# Patient Record
Sex: MALE | Race: WHITE | NOT HISPANIC OR LATINO
[De-identification: names, ages, dates, MRNs, and addresses within clinical notes are randomized per-mention and may not be internally consistent; named-entity substitution may affect disease eponyms.]

---

## 2019-01-09 ENCOUNTER — APPOINTMENT (OUTPATIENT)
Dept: ORTHOPEDIC SURGERY | Facility: CLINIC | Age: 79
End: 2019-01-09

## 2020-01-17 ENCOUNTER — APPOINTMENT (OUTPATIENT)
Dept: NEUROLOGY | Facility: CLINIC | Age: 80
End: 2020-01-17

## 2020-09-09 ENCOUNTER — TRANSCRIPTION ENCOUNTER (OUTPATIENT)
Age: 80
End: 2020-09-09

## 2021-08-09 ENCOUNTER — EMERGENCY (EMERGENCY)
Facility: HOSPITAL | Age: 81
LOS: 1 days | Discharge: HOME | End: 2021-08-09
Attending: EMERGENCY MEDICINE
Payer: MEDICARE

## 2021-08-09 VITALS
SYSTOLIC BLOOD PRESSURE: 175 MMHG | WEIGHT: 184.97 LBS | DIASTOLIC BLOOD PRESSURE: 95 MMHG | TEMPERATURE: 97 F | HEIGHT: 70 IN | HEART RATE: 82 BPM | OXYGEN SATURATION: 96 % | RESPIRATION RATE: 20 BRPM

## 2021-08-09 LAB
BASOPHILS # BLD AUTO: 0.03 K/UL — SIGNIFICANT CHANGE UP (ref 0–0.2)
BASOPHILS NFR BLD AUTO: 0.3 % — SIGNIFICANT CHANGE UP (ref 0–2)
EOSINOPHIL # BLD AUTO: 0.16 K/UL — SIGNIFICANT CHANGE UP (ref 0–0.5)
EOSINOPHIL NFR BLD AUTO: 1.7 % — SIGNIFICANT CHANGE UP (ref 0–6)
HCT VFR BLD CALC: 46.6 % — SIGNIFICANT CHANGE UP (ref 39–50)
HGB BLD-MCNC: 15.6 G/DL — SIGNIFICANT CHANGE UP (ref 13–17)
IMM GRANULOCYTES NFR BLD AUTO: 0.4 % — SIGNIFICANT CHANGE UP (ref 0–1.5)
LYMPHOCYTES # BLD AUTO: 1.13 K/UL — SIGNIFICANT CHANGE UP (ref 1–3.3)
LYMPHOCYTES # BLD AUTO: 12.1 % — LOW (ref 13–44)
MCHC RBC-ENTMCNC: 29.9 PG — SIGNIFICANT CHANGE UP (ref 27–34)
MCHC RBC-ENTMCNC: 33.5 GM/DL — SIGNIFICANT CHANGE UP (ref 32–36)
MCV RBC AUTO: 89.3 FL — SIGNIFICANT CHANGE UP (ref 80–100)
MONOCYTES # BLD AUTO: 0.59 K/UL — SIGNIFICANT CHANGE UP (ref 0–0.9)
MONOCYTES NFR BLD AUTO: 6.3 % — SIGNIFICANT CHANGE UP (ref 2–14)
NEUTROPHILS # BLD AUTO: 7.39 K/UL — SIGNIFICANT CHANGE UP (ref 1.8–7.4)
NEUTROPHILS NFR BLD AUTO: 79.2 % — HIGH (ref 43–77)
PLATELET # BLD AUTO: 296 K/UL — SIGNIFICANT CHANGE UP (ref 150–400)
RBC # BLD: 5.22 M/UL — SIGNIFICANT CHANGE UP (ref 4.2–5.8)
RBC # FLD: 13.4 % — SIGNIFICANT CHANGE UP (ref 10.3–14.5)
WBC # BLD: 9.34 K/UL — SIGNIFICANT CHANGE UP (ref 3.8–10.5)
WBC # FLD AUTO: 9.34 K/UL — SIGNIFICANT CHANGE UP (ref 3.8–10.5)

## 2021-08-09 PROCEDURE — 99285 EMERGENCY DEPT VISIT HI MDM: CPT

## 2021-08-09 RX ORDER — KETOROLAC TROMETHAMINE 30 MG/ML
15 SYRINGE (ML) INJECTION ONCE
Refills: 0 | Status: DISCONTINUED | OUTPATIENT
Start: 2021-08-09 | End: 2021-08-09

## 2021-08-09 RX ORDER — ONDANSETRON 8 MG/1
4 TABLET, FILM COATED ORAL ONCE
Refills: 0 | Status: COMPLETED | OUTPATIENT
Start: 2021-08-09 | End: 2021-08-09

## 2021-08-09 RX ORDER — SODIUM CHLORIDE 9 MG/ML
1000 INJECTION INTRAMUSCULAR; INTRAVENOUS; SUBCUTANEOUS ONCE
Refills: 0 | Status: COMPLETED | OUTPATIENT
Start: 2021-08-09 | End: 2021-08-09

## 2021-08-09 RX ADMIN — ONDANSETRON 4 MILLIGRAM(S): 8 TABLET, FILM COATED ORAL at 23:17

## 2021-08-09 RX ADMIN — Medication 15 MILLIGRAM(S): at 23:16

## 2021-08-09 NOTE — ED ADULT NURSE REASSESSMENT NOTE - NS ED NURSE REASSESS COMMENT FT1
Report received from Mony MOREIRA. Pt received A&Ox4 c/o nauseousness @ this time. MD Heard made aware. Respirations even & unlabored. NAD present. Pt given call bell for further assistance. Pt awaiting CT @ this time. Pt made aware of plan of care and verbalized understanding.

## 2021-08-09 NOTE — ED ADULT TRIAGE NOTE - CHIEF COMPLAINT QUOTE
Pt BIBA A&Ox3 c/o left flank pain with associated LLQ abdominal pain and nausea. Pt reports hx of kidney stones and states pain is similar to when he had them. Denies any urinary symptoms or fever. hypertensive in triage.

## 2021-08-10 VITALS
SYSTOLIC BLOOD PRESSURE: 169 MMHG | DIASTOLIC BLOOD PRESSURE: 86 MMHG | HEART RATE: 83 BPM | TEMPERATURE: 98 F | RESPIRATION RATE: 18 BRPM | OXYGEN SATURATION: 97 %

## 2021-08-10 LAB
ANION GAP SERPL CALC-SCNC: 11 MMOL/L — SIGNIFICANT CHANGE UP (ref 5–17)
APPEARANCE UR: CLEAR — SIGNIFICANT CHANGE UP
BACTERIA # UR AUTO: ABNORMAL
BILIRUB UR-MCNC: NEGATIVE — SIGNIFICANT CHANGE UP
BUN SERPL-MCNC: 19 MG/DL — SIGNIFICANT CHANGE UP (ref 8–20)
CALCIUM SERPL-MCNC: 8.7 MG/DL — SIGNIFICANT CHANGE UP (ref 8.6–10.2)
CHLORIDE SERPL-SCNC: 103 MMOL/L — SIGNIFICANT CHANGE UP (ref 98–107)
CO2 SERPL-SCNC: 22 MMOL/L — SIGNIFICANT CHANGE UP (ref 22–29)
COLOR SPEC: YELLOW — SIGNIFICANT CHANGE UP
CREAT SERPL-MCNC: 1.14 MG/DL — SIGNIFICANT CHANGE UP (ref 0.5–1.3)
DIFF PNL FLD: ABNORMAL
EPI CELLS # UR: SIGNIFICANT CHANGE UP
GLUCOSE SERPL-MCNC: 112 MG/DL — HIGH (ref 70–99)
GLUCOSE UR QL: NEGATIVE MG/DL — SIGNIFICANT CHANGE UP
KETONES UR-MCNC: ABNORMAL
LEUKOCYTE ESTERASE UR-ACNC: ABNORMAL
NITRITE UR-MCNC: NEGATIVE — SIGNIFICANT CHANGE UP
PH UR: 7 — SIGNIFICANT CHANGE UP (ref 5–8)
POTASSIUM SERPL-MCNC: 4.3 MMOL/L — SIGNIFICANT CHANGE UP (ref 3.5–5.3)
POTASSIUM SERPL-SCNC: 4.3 MMOL/L — SIGNIFICANT CHANGE UP (ref 3.5–5.3)
PROT UR-MCNC: 30 MG/DL
RBC CASTS # UR COMP ASSIST: SIGNIFICANT CHANGE UP /HPF (ref 0–4)
SODIUM SERPL-SCNC: 136 MMOL/L — SIGNIFICANT CHANGE UP (ref 135–145)
SP GR SPEC: 1 — LOW (ref 1.01–1.02)
UROBILINOGEN FLD QL: NEGATIVE MG/DL — SIGNIFICANT CHANGE UP
WBC UR QL: SIGNIFICANT CHANGE UP

## 2021-08-10 PROCEDURE — 74176 CT ABD & PELVIS W/O CONTRAST: CPT | Mod: MA

## 2021-08-10 PROCEDURE — 36415 COLL VENOUS BLD VENIPUNCTURE: CPT

## 2021-08-10 PROCEDURE — 96374 THER/PROPH/DIAG INJ IV PUSH: CPT

## 2021-08-10 PROCEDURE — 80048 BASIC METABOLIC PNL TOTAL CA: CPT

## 2021-08-10 PROCEDURE — 96375 TX/PRO/DX INJ NEW DRUG ADDON: CPT

## 2021-08-10 PROCEDURE — 81001 URINALYSIS AUTO W/SCOPE: CPT

## 2021-08-10 PROCEDURE — 99284 EMERGENCY DEPT VISIT MOD MDM: CPT | Mod: 25

## 2021-08-10 PROCEDURE — 96376 TX/PRO/DX INJ SAME DRUG ADON: CPT

## 2021-08-10 PROCEDURE — 74176 CT ABD & PELVIS W/O CONTRAST: CPT | Mod: 26,MA

## 2021-08-10 PROCEDURE — 85025 COMPLETE CBC W/AUTO DIFF WBC: CPT

## 2021-08-10 RX ORDER — ONDANSETRON 8 MG/1
1 TABLET, FILM COATED ORAL
Qty: 12 | Refills: 0
Start: 2021-08-10 | End: 2021-08-13

## 2021-08-10 RX ADMIN — ONDANSETRON 4 MILLIGRAM(S): 8 TABLET, FILM COATED ORAL at 00:00

## 2021-08-10 RX ADMIN — SODIUM CHLORIDE 1000 MILLILITER(S): 9 INJECTION INTRAMUSCULAR; INTRAVENOUS; SUBCUTANEOUS at 00:00

## 2021-08-10 NOTE — ED ADULT NURSE NOTE - OBJECTIVE STATEMENT
Pt BIBA A&Ox3 c/o left flank pain with associated LLQ abdominal pain and nausea, pt has pmhx of kidney stones, pain is similar to episodes in past. respirations even and unlabored. pt in NAD after receiving meds. POC discussed w/ patient, will continue to monitor. pt has no known allergies, denies cp, sob or ha.

## 2021-08-10 NOTE — ED PROVIDER NOTE - ATTENDING CONTRIBUTION TO CARE
I personally saw the patient with the resident, and completed the key components of the history and physical exam. I then discussed the management plan with the resident.   gen in nad resp clear cardiac n omurmur abd soft neurop intact

## 2021-08-10 NOTE — ED PROVIDER NOTE - CLINICAL SUMMARY MEDICAL DECISION MAKING FREE TEXT BOX
Patient presenting with known history of kidney stones. Ct imaging showing nonobstructive stones. Will d/c with instructions for pcp f/u and conservative management.

## 2021-08-10 NOTE — ED PROVIDER NOTE - OBJECTIVE STATEMENT
82 y/o male with PMHx of 4 prior kidney stones  p/w abdominal pain and flank pain starting 2 days worsening  sharp, feel similar to previous episodes denies urinary symptoms denies fevers chill chest pain states he had abdominal mesh surgeries many years ago, denies other abdominal surgeries

## 2021-08-10 NOTE — ED PROVIDER NOTE - PATIENT PORTAL LINK FT
You can access the FollowMyHealth Patient Portal offered by [facility] by registering at the following website: [email or web address]. By joining [facility]’s FollowMyHealth portal, you will also be able to view your health information using other applications (apps) compatible with our system.

## 2021-12-12 ENCOUNTER — TRANSCRIPTION ENCOUNTER (OUTPATIENT)
Age: 81
End: 2021-12-12

## 2023-01-19 ENCOUNTER — TRANSCRIPTION ENCOUNTER (OUTPATIENT)
Age: 83
End: 2023-01-19

## 2023-01-19 ENCOUNTER — NON-APPOINTMENT (OUTPATIENT)
Age: 83
End: 2023-01-19

## 2023-01-19 ENCOUNTER — INPATIENT (INPATIENT)
Facility: HOSPITAL | Age: 83
LOS: 0 days | Discharge: HOME | DRG: 247 | End: 2023-01-20
Attending: FAMILY MEDICINE | Admitting: STUDENT IN AN ORGANIZED HEALTH CARE EDUCATION/TRAINING PROGRAM
Payer: MEDICARE

## 2023-01-19 VITALS
DIASTOLIC BLOOD PRESSURE: 86 MMHG | SYSTOLIC BLOOD PRESSURE: 158 MMHG | TEMPERATURE: 98 F | RESPIRATION RATE: 20 BRPM | WEIGHT: 190.04 LBS | HEART RATE: 77 BPM | HEIGHT: 70 IN | OXYGEN SATURATION: 98 %

## 2023-01-19 DIAGNOSIS — R07.9 CHEST PAIN, UNSPECIFIED: ICD-10-CM

## 2023-01-19 DIAGNOSIS — I21.4 NON-ST ELEVATION (NSTEMI) MYOCARDIAL INFARCTION: ICD-10-CM

## 2023-01-19 DIAGNOSIS — Z96.651 PRESENCE OF RIGHT ARTIFICIAL KNEE JOINT: Chronic | ICD-10-CM

## 2023-01-19 DIAGNOSIS — Z96.652 PRESENCE OF LEFT ARTIFICIAL KNEE JOINT: Chronic | ICD-10-CM

## 2023-01-19 PROBLEM — N20.0 CALCULUS OF KIDNEY: Chronic | Status: ACTIVE | Noted: 2021-08-10

## 2023-01-19 LAB
ALBUMIN SERPL ELPH-MCNC: 3.9 G/DL — SIGNIFICANT CHANGE UP (ref 3.3–5.2)
ALP SERPL-CCNC: 106 U/L — SIGNIFICANT CHANGE UP (ref 40–120)
ALT FLD-CCNC: 13 U/L — SIGNIFICANT CHANGE UP
ANION GAP SERPL CALC-SCNC: 9 MMOL/L — SIGNIFICANT CHANGE UP (ref 5–17)
APTT BLD: 28.8 SEC — SIGNIFICANT CHANGE UP (ref 27.5–35.5)
AST SERPL-CCNC: 23 U/L — SIGNIFICANT CHANGE UP
BASOPHILS # BLD AUTO: 0.03 K/UL — SIGNIFICANT CHANGE UP (ref 0–0.2)
BASOPHILS NFR BLD AUTO: 0.3 % — SIGNIFICANT CHANGE UP (ref 0–2)
BILIRUB SERPL-MCNC: 0.3 MG/DL — LOW (ref 0.4–2)
BLD GP AB SCN SERPL QL: SIGNIFICANT CHANGE UP
BUN SERPL-MCNC: 22.9 MG/DL — HIGH (ref 8–20)
CALCIUM SERPL-MCNC: 8.7 MG/DL — SIGNIFICANT CHANGE UP (ref 8.4–10.5)
CHLORIDE SERPL-SCNC: 105 MMOL/L — SIGNIFICANT CHANGE UP (ref 96–108)
CO2 SERPL-SCNC: 26 MMOL/L — SIGNIFICANT CHANGE UP (ref 22–29)
CREAT SERPL-MCNC: 1.24 MG/DL — SIGNIFICANT CHANGE UP (ref 0.5–1.3)
EGFR: 59 ML/MIN/1.73M2 — LOW
EOSINOPHIL # BLD AUTO: 0.09 K/UL — SIGNIFICANT CHANGE UP (ref 0–0.5)
EOSINOPHIL NFR BLD AUTO: 1 % — SIGNIFICANT CHANGE UP (ref 0–6)
FLUAV AG NPH QL: SIGNIFICANT CHANGE UP
FLUBV AG NPH QL: SIGNIFICANT CHANGE UP
GLUCOSE SERPL-MCNC: 120 MG/DL — HIGH (ref 70–99)
HCT VFR BLD CALC: 46.6 % — SIGNIFICANT CHANGE UP (ref 39–50)
HGB BLD-MCNC: 15.4 G/DL — SIGNIFICANT CHANGE UP (ref 13–17)
IMM GRANULOCYTES NFR BLD AUTO: 0.4 % — SIGNIFICANT CHANGE UP (ref 0–0.9)
INR BLD: 1.01 RATIO — SIGNIFICANT CHANGE UP (ref 0.88–1.16)
LYMPHOCYTES # BLD AUTO: 0.83 K/UL — LOW (ref 1–3.3)
LYMPHOCYTES # BLD AUTO: 9 % — LOW (ref 13–44)
MCHC RBC-ENTMCNC: 29.7 PG — SIGNIFICANT CHANGE UP (ref 27–34)
MCHC RBC-ENTMCNC: 33 GM/DL — SIGNIFICANT CHANGE UP (ref 32–36)
MCV RBC AUTO: 89.8 FL — SIGNIFICANT CHANGE UP (ref 80–100)
MONOCYTES # BLD AUTO: 0.5 K/UL — SIGNIFICANT CHANGE UP (ref 0–0.9)
MONOCYTES NFR BLD AUTO: 5.4 % — SIGNIFICANT CHANGE UP (ref 2–14)
NEUTROPHILS # BLD AUTO: 7.72 K/UL — HIGH (ref 1.8–7.4)
NEUTROPHILS NFR BLD AUTO: 83.9 % — HIGH (ref 43–77)
NT-PROBNP SERPL-SCNC: 64 PG/ML — SIGNIFICANT CHANGE UP (ref 0–300)
PLATELET # BLD AUTO: 297 K/UL — SIGNIFICANT CHANGE UP (ref 150–400)
POTASSIUM SERPL-MCNC: 4.2 MMOL/L — SIGNIFICANT CHANGE UP (ref 3.5–5.3)
POTASSIUM SERPL-SCNC: 4.2 MMOL/L — SIGNIFICANT CHANGE UP (ref 3.5–5.3)
PROT SERPL-MCNC: 7 G/DL — SIGNIFICANT CHANGE UP (ref 6.6–8.7)
PROTHROM AB SERPL-ACNC: 11.7 SEC — SIGNIFICANT CHANGE UP (ref 10.5–13.4)
RBC # BLD: 5.19 M/UL — SIGNIFICANT CHANGE UP (ref 4.2–5.8)
RBC # FLD: 13.2 % — SIGNIFICANT CHANGE UP (ref 10.3–14.5)
RSV RNA NPH QL NAA+NON-PROBE: SIGNIFICANT CHANGE UP
SARS-COV-2 RNA SPEC QL NAA+PROBE: SIGNIFICANT CHANGE UP
SODIUM SERPL-SCNC: 140 MMOL/L — SIGNIFICANT CHANGE UP (ref 135–145)
TROPONIN T SERPL-MCNC: 0.02 NG/ML — SIGNIFICANT CHANGE UP (ref 0–0.06)
TROPONIN T SERPL-MCNC: 3.81 NG/ML — HIGH (ref 0–0.06)
WBC # BLD: 9.21 K/UL — SIGNIFICANT CHANGE UP (ref 3.8–10.5)
WBC # FLD AUTO: 9.21 K/UL — SIGNIFICANT CHANGE UP (ref 3.8–10.5)

## 2023-01-19 PROCEDURE — 93306 TTE W/DOPPLER COMPLETE: CPT | Mod: 26

## 2023-01-19 PROCEDURE — 92978 ENDOLUMINL IVUS OCT C 1ST: CPT | Mod: 26,LC

## 2023-01-19 PROCEDURE — 99152 MOD SED SAME PHYS/QHP 5/>YRS: CPT

## 2023-01-19 PROCEDURE — 99285 EMERGENCY DEPT VISIT HI MDM: CPT | Mod: CS,GC

## 2023-01-19 PROCEDURE — 92941 PRQ TRLML REVSC TOT OCCL AMI: CPT | Mod: LC

## 2023-01-19 PROCEDURE — 93010 ELECTROCARDIOGRAM REPORT: CPT | Mod: 76

## 2023-01-19 PROCEDURE — 71045 X-RAY EXAM CHEST 1 VIEW: CPT | Mod: 26

## 2023-01-19 PROCEDURE — 93458 L HRT ARTERY/VENTRICLE ANGIO: CPT | Mod: 26,XU

## 2023-01-19 PROCEDURE — 99223 1ST HOSP IP/OBS HIGH 75: CPT

## 2023-01-19 RX ORDER — HEPARIN SODIUM 5000 [USP'U]/ML
INJECTION INTRAVENOUS; SUBCUTANEOUS
Qty: 25000 | Refills: 0 | Status: DISCONTINUED | OUTPATIENT
Start: 2023-01-19 | End: 2023-01-20

## 2023-01-19 RX ORDER — HEPARIN SODIUM 5000 [USP'U]/ML
5300 INJECTION INTRAVENOUS; SUBCUTANEOUS EVERY 6 HOURS
Refills: 0 | Status: DISCONTINUED | OUTPATIENT
Start: 2023-01-19 | End: 2023-01-19

## 2023-01-19 RX ORDER — TICAGRELOR 90 MG/1
90 TABLET ORAL EVERY 12 HOURS
Refills: 0 | Status: DISCONTINUED | OUTPATIENT
Start: 2023-01-20 | End: 2023-01-20

## 2023-01-19 RX ORDER — SODIUM CHLORIDE 9 MG/ML
1000 INJECTION INTRAMUSCULAR; INTRAVENOUS; SUBCUTANEOUS
Refills: 0 | Status: COMPLETED | OUTPATIENT
Start: 2023-01-19 | End: 2023-01-19

## 2023-01-19 RX ORDER — ONDANSETRON 8 MG/1
4 TABLET, FILM COATED ORAL ONCE
Refills: 0 | Status: COMPLETED | OUTPATIENT
Start: 2023-01-19 | End: 2023-01-19

## 2023-01-19 RX ORDER — METOPROLOL TARTRATE 50 MG
12.5 TABLET ORAL
Refills: 0 | Status: DISCONTINUED | OUTPATIENT
Start: 2023-01-19 | End: 2023-01-20

## 2023-01-19 RX ORDER — PANTOPRAZOLE SODIUM 20 MG/1
40 TABLET, DELAYED RELEASE ORAL
Refills: 0 | Status: DISCONTINUED | OUTPATIENT
Start: 2023-01-19 | End: 2023-01-20

## 2023-01-19 RX ORDER — ASPIRIN/CALCIUM CARB/MAGNESIUM 324 MG
81 TABLET ORAL DAILY
Refills: 0 | Status: DISCONTINUED | OUTPATIENT
Start: 2023-01-19 | End: 2023-01-20

## 2023-01-19 RX ORDER — ASPIRIN/CALCIUM CARB/MAGNESIUM 324 MG
324 TABLET ORAL ONCE
Refills: 0 | Status: COMPLETED | OUTPATIENT
Start: 2023-01-19 | End: 2023-01-19

## 2023-01-19 RX ORDER — LANOLIN ALCOHOL/MO/W.PET/CERES
3 CREAM (GRAM) TOPICAL AT BEDTIME
Refills: 0 | Status: DISCONTINUED | OUTPATIENT
Start: 2023-01-19 | End: 2023-01-20

## 2023-01-19 RX ORDER — ATORVASTATIN CALCIUM 80 MG/1
40 TABLET, FILM COATED ORAL AT BEDTIME
Refills: 0 | Status: DISCONTINUED | OUTPATIENT
Start: 2023-01-19 | End: 2023-01-20

## 2023-01-19 RX ORDER — ZOLPIDEM TARTRATE 10 MG/1
5 TABLET ORAL AT BEDTIME
Refills: 0 | Status: DISCONTINUED | OUTPATIENT
Start: 2023-01-19 | End: 2023-01-20

## 2023-01-19 RX ORDER — SODIUM CHLORIDE 9 MG/ML
250 INJECTION INTRAMUSCULAR; INTRAVENOUS; SUBCUTANEOUS ONCE
Refills: 0 | Status: DISCONTINUED | OUTPATIENT
Start: 2023-01-19 | End: 2023-01-19

## 2023-01-19 RX ORDER — HEPARIN SODIUM 5000 [USP'U]/ML
5300 INJECTION INTRAVENOUS; SUBCUTANEOUS EVERY 6 HOURS
Refills: 0 | Status: DISCONTINUED | OUTPATIENT
Start: 2023-01-19 | End: 2023-01-20

## 2023-01-19 RX ORDER — FINASTERIDE 5 MG/1
5 TABLET, FILM COATED ORAL DAILY
Refills: 0 | Status: DISCONTINUED | OUTPATIENT
Start: 2023-01-19 | End: 2023-01-20

## 2023-01-19 RX ORDER — TAMSULOSIN HYDROCHLORIDE 0.4 MG/1
0.8 CAPSULE ORAL AT BEDTIME
Refills: 0 | Status: DISCONTINUED | OUTPATIENT
Start: 2023-01-19 | End: 2023-01-20

## 2023-01-19 RX ORDER — ONDANSETRON 8 MG/1
4 TABLET, FILM COATED ORAL EVERY 8 HOURS
Refills: 0 | Status: DISCONTINUED | OUTPATIENT
Start: 2023-01-19 | End: 2023-01-20

## 2023-01-19 RX ORDER — HEPARIN SODIUM 5000 [USP'U]/ML
5000 INJECTION INTRAVENOUS; SUBCUTANEOUS ONCE
Refills: 0 | Status: COMPLETED | OUTPATIENT
Start: 2023-01-19 | End: 2023-01-19

## 2023-01-19 RX ORDER — HEPARIN SODIUM 5000 [USP'U]/ML
INJECTION INTRAVENOUS; SUBCUTANEOUS
Qty: 25000 | Refills: 0 | Status: DISCONTINUED | OUTPATIENT
Start: 2023-01-19 | End: 2023-01-19

## 2023-01-19 RX ORDER — CLOPIDOGREL BISULFATE 75 MG/1
600 TABLET, FILM COATED ORAL ONCE
Refills: 0 | Status: COMPLETED | OUTPATIENT
Start: 2023-01-19 | End: 2023-01-19

## 2023-01-19 RX ORDER — LOSARTAN POTASSIUM 100 MG/1
25 TABLET, FILM COATED ORAL DAILY
Refills: 0 | Status: DISCONTINUED | OUTPATIENT
Start: 2023-01-20 | End: 2023-01-20

## 2023-01-19 RX ORDER — HEPARIN SODIUM 5000 [USP'U]/ML
5000 INJECTION INTRAVENOUS; SUBCUTANEOUS EVERY 8 HOURS
Refills: 0 | Status: DISCONTINUED | OUTPATIENT
Start: 2023-01-19 | End: 2023-01-19

## 2023-01-19 RX ORDER — ACETAMINOPHEN 500 MG
650 TABLET ORAL EVERY 6 HOURS
Refills: 0 | Status: DISCONTINUED | OUTPATIENT
Start: 2023-01-19 | End: 2023-01-20

## 2023-01-19 RX ADMIN — Medication 12.5 MILLIGRAM(S): at 20:28

## 2023-01-19 RX ADMIN — FINASTERIDE 5 MILLIGRAM(S): 5 TABLET, FILM COATED ORAL at 22:38

## 2023-01-19 RX ADMIN — HEPARIN SODIUM 5000 UNIT(S): 5000 INJECTION INTRAVENOUS; SUBCUTANEOUS at 14:14

## 2023-01-19 RX ADMIN — CLOPIDOGREL BISULFATE 600 MILLIGRAM(S): 75 TABLET, FILM COATED ORAL at 14:31

## 2023-01-19 RX ADMIN — Medication 324 MILLIGRAM(S): at 09:04

## 2023-01-19 RX ADMIN — ONDANSETRON 4 MILLIGRAM(S): 8 TABLET, FILM COATED ORAL at 19:38

## 2023-01-19 RX ADMIN — ZOLPIDEM TARTRATE 5 MILLIGRAM(S): 10 TABLET ORAL at 22:38

## 2023-01-19 RX ADMIN — TAMSULOSIN HYDROCHLORIDE 0.8 MILLIGRAM(S): 0.4 CAPSULE ORAL at 21:13

## 2023-01-19 RX ADMIN — Medication 75 MILLIGRAM(S): at 18:56

## 2023-01-19 RX ADMIN — ATORVASTATIN CALCIUM 40 MILLIGRAM(S): 80 TABLET, FILM COATED ORAL at 21:13

## 2023-01-19 RX ADMIN — SODIUM CHLORIDE 250 MILLILITER(S): 9 INJECTION INTRAMUSCULAR; INTRAVENOUS; SUBCUTANEOUS at 23:19

## 2023-01-19 RX ADMIN — HEPARIN SODIUM 1000 UNIT(S)/HR: 5000 INJECTION INTRAVENOUS; SUBCUTANEOUS at 14:14

## 2023-01-19 NOTE — H&P ADULT - HISTORY OF PRESENT ILLNESS
81 yo male with hx of Neuropathy, GERD, BPH and renal stones presented with complaints of chest pain. Patient reports that he was walking around this morning and started to have chest pain located near left shoulder and moved to right shoulder. Pain initially worsened but has now improved slightly. States that pain has been waxing and waning. No prior episodes of chest pain. Reports only previous cardiac work up was about 20 years ago without any significant findings. No other complaints.

## 2023-01-19 NOTE — ED PROVIDER NOTE - PROGRESS NOTE DETAILS
JK - patient still with intermittent CP at this time; labs WNL. Cardiology consulted, recommended TTE as well as NST; cath pending results at this time. Patient ready and agreeable to admission to telemetry for tele monitoring, cardiac recs, further imaging.

## 2023-01-19 NOTE — DISCHARGE NOTE PROVIDER - ATTENDING DISCHARGE PHYSICAL EXAMINATION:
T(C): 36.4 (01-20-23 @ 06:05), Max: 36.8 (01-19-23 @ 11:38)  HR: 61 (01-20-23 @ 06:05) (61 - 89)  BP: 127/67 (01-20-23 @ 06:05) (127/67 - 168/95)  RR: 17 (01-20-23 @ 06:05) (17 - 20)  SpO2: 98% (01-19-23 @ 22:00) (95% - 98%)RA    GEN - NAD  HEENT - NCAT, EOMI, MELLO,   RESP - CTA BL, no wheeze/rhonchi/crackles. not on supplemental O2.  CARDIO - NS1S2, RRR. No murmurs  ABD - Soft/Non tender/Non distended. Normal BS x4 quadrants.   Ext - No LYUBOV. RT GROIN SIDE: MILD BRUISE,non tender,positive pulse  MSK - full ROM of BL upper and lower extremities without pain or restriction. BL 5/5 strength on upper and lower extremities.   Neuro - cn 2-12 grossly intact.  no visible seizure activity appreciated. no tremor. gait not observed.   Psych- AAOx3. attentive. normal affect.

## 2023-01-19 NOTE — H&P ADULT - ASSESSMENT
83 yo male with hx of Neuropathy, GERD, BPH and renal stones presented with complaints of chest pain. Patient reports that he was walking around this morning and started to have chest pain located near left shoulder and moved to right shoulder. Pain initially worsened but has now improved slightly. States that pain has been waxing and waning. No prior episodes of chest pain. Reports only previous cardiac work up was about 20 years ago without any significant findings. No other complaints.      #Chest pain to r/o ACS  Tele monitoring  Trend trops/EKG  TTE  NST in AM  Cardiology recs appreciated  ASA daily     #Neuropathy  Pregablin 75 BID    #BPH  Finasteride   Flomax for alfuzosin    #GERD  Protonix    #Insomnia  Zolpidem ordered     DVT prophylaxis: Heparin

## 2023-01-19 NOTE — DISCHARGE NOTE PROVIDER - NSDCMRMEDTOKEN_GEN_ALL_CORE_FT
alfuzosin 10 mg oral tablet, extended release: 1 tab(s) orally once a day  finasteride 5 mg oral tablet: 1 tab(s) orally once a day  omeprazole 20 mg oral delayed release capsule: 1 cap(s) orally once a day  pregabalin 75 mg oral capsule: 1 cap(s) orally 2 times a day  zolpidem 10 mg oral tablet: 1 tab(s) orally once a day (at bedtime)   alfuzosin 10 mg oral tablet, extended release: 1 tab(s) orally once a day  aspirin 81 mg oral tablet, chewable: 1 tab(s) orally once a day  atorvastatin 40 mg oral tablet: 1 tab(s) orally once a day (at bedtime)  finasteride 5 mg oral tablet: 1 tab(s) orally once a day  losartan 25 mg oral tablet: 1 tab(s) orally once a day  melatonin 3 mg oral tablet: 1 tab(s) orally once a day (at bedtime), As needed, Insomnia  metoprolol tartrate 25 mg oral tablet: 0.5 tab(s) orally 2 times a day   omeprazole 20 mg oral delayed release capsule: 1 cap(s) orally once a day  pregabalin 75 mg oral capsule: 1 cap(s) orally 2 times a day  tamsulosin 0.4 mg oral capsule: 2 cap(s) orally once a day (at bedtime)  ticagrelor 90 mg oral tablet: 1 tab(s) orally every 12 hours  zolpidem 10 mg oral tablet: 1 tab(s) orally once a day (at bedtime)   alfuzosin 10 mg oral tablet, extended release: 1 tab(s) orally once a day  aspirin 81 mg oral tablet, chewable: 1 tab(s) orally once a day  atorvastatin 40 mg oral tablet: 1 tab(s) orally once a day (at bedtime)  finasteride 5 mg oral tablet: 1 tab(s) orally once a day  losartan 25 mg oral tablet: 1 tab(s) orally once a day  melatonin 3 mg oral tablet: 1 tab(s) orally once a day (at bedtime), As needed, Insomnia  metoprolol tartrate 25 mg oral tablet: 0.5 tab(s) orally 2 times a day   omeprazole 20 mg oral delayed release capsule: 1 cap(s) orally once a day  pregabalin 75 mg oral capsule: 1 cap(s) orally 2 times a day  ticagrelor 90 mg oral tablet: 1 tab(s) orally every 12 hours  zolpidem 10 mg oral tablet: 1 tab(s) orally once a day (at bedtime)

## 2023-01-19 NOTE — PROGRESS NOTE ADULT - PROBLEM SELECTOR PLAN 1
#1 Troponin - 0.02  #2 Troponin - 3.81  EKG with developing ST/T wave changes in the Anterior and Lateral leads    Plan:   mg given at 0900  Plavix 600 mg given at 1400  Urgent C

## 2023-01-19 NOTE — ED PROVIDER NOTE - NS ED ROS FT
REVIEW OF SYSTEMS:  CONSTITUTIONAL: No weakness, fevers or chills  EYES/ENT: No visual changes  NECK: + jaw pain  RESPIRATORY: No cough, wheezing, hemoptysis; mild shortness of breath  CARDIOVASCULAR: + chest pain   GASTROINTESTINAL: No abdominal or epigastric pain. + nausea, no vomiting; No diarrhea or constipation.   GENITOURINARY: No dysuria, frequency or hematuria

## 2023-01-19 NOTE — CHART NOTE - NSCHARTNOTEFT_GEN_A_CORE
Called due to patient with troponin elevation 0.02->3.81   EKG done STAT - ST depressions in V1-V6, now  with possible J point elevations.  EKG reviewed with Dr. Angeles, added for urgent LHC today.    Plan:   - mild chest pain, given 324 ASA  - give Plavix 600mg PO STAT  - start heparin GTT ACS nomogram  - keep NPO, plan for urgent LHC today  - discussed with patient's  son in law, Martínez who is interventional RN at [employer] [facility]  - discussed with Dr. Franck Lagunas & Dr. Solomon Pascual

## 2023-01-19 NOTE — ED PROVIDER NOTE - ATTENDING CONTRIBUTION TO CARE
81yo m with PMH of gastritis and kidney stones presenting today with bilateral  dull chest pain radiating to the jaw and right arm x 1 hour.pe awake alert heent ncat neck supple cor s1s2 lung clear abd soft nontender neuro nonfocal dx chest pain eval acs;

## 2023-01-19 NOTE — CONSULT NOTE ADULT - ASSESSMENT
83 y/o M with PMH neuropathy, SHASHANK and renal stones who presents to [facility] ED with complains of new onset chest pain 3/10 starting this morning at 7am while walking and associated with SOB and radiation to right arm and jaw. He last had a cardiac workup greater than 20 years ago. In ED, EKG with ST depressions in V1-V6 which patient states "might have been there previously". He last saw Dr. Larson 2 weeks ago, EKG done at that time. He endorses SOB while climbing stairs, denies any family hx of cardiac disease. /86, 324mg ASA given. Plan for TTE and NST today. Denies back pain, headache, dizziness,  ALVES, diaphoresis, syncope or N/V.

## 2023-01-19 NOTE — H&P ADULT - NSHPPHYSICALEXAM_GEN_ALL_CORE
PHYSICAL EXAM:  Vital Signs Last 24 Hrs  T(F): 97.4 (19 Jan 2023 13:29), Max: 98.2 (19 Jan 2023 11:38)  HR: 77 (19 Jan 2023 13:29) (73 - 77)  BP: 163/102 (19 Jan 2023 13:29) (158/86 - 163/102)  RR: 20 (19 Jan 2023 13:29) (18 - 20)  SpO2: 97% (19 Jan 2023 13:29) (95% - 98%)    GENERAL: NAD, Resting in bed  Eyes: EOMI, PERRLA  ENMT: Conjunctiva and sclera clear; supple neck, No JVD  Cardiovascular: S1,S2, RRR, No murmur  Respiratory: CTA B/L, Non-labored breathing  GI: Bowel sounds present; Soft, Nontender, Nondistended. No hepatomegaly  Genitourinary: Deferred  Skin:  no breakdowns, ulcers or discharge, No rashes or lesions  Neurology: Alert & Oriented X3, non-focal and spontaneous movements of all extremities, CN 2 to 12 grossly intact   Psych: Appropriate mood and affect, calm, pleasant, Responds appropriately to questions

## 2023-01-19 NOTE — ED PROVIDER NOTE - NSICDXPASTSURGICALHX_GEN_ALL_CORE_FT
PAST SURGICAL HISTORY:  S/P total knee replacement, left     Total knee replacement status, right

## 2023-01-19 NOTE — ED PROVIDER NOTE - OBJECTIVE STATEMENT
Pt is an 83yo m with PMH of gastritis and kidney stones presenting today with bilateral chest pain radiating to the jaw and right arm x 1 hour. Pt reports pain started at 7AM this morning and is dull. Pt endorses nausea, no vomiting. Denies diaphoresis. Endorses mild shortness of breath. Pt has not seen a cardiologist in ~15 years. Previous stress test 15 years ago. Denies smoking, alcohol use. Pt has not had chest pain before.

## 2023-01-19 NOTE — DISCHARGE NOTE PROVIDER - NSDCFUADDINST_GEN_ALL_CORE_FT
No heavy lifting, driving, sex, tub baths, swimming, or any activity that submerges the lower half of the body in water for 48 hours.  Limited walking and stairs for 48 hours.    Change the bandaid after 24 hours and every 24 hours after that.  Keep the puncture site dry and covered with a bandaid until a scab forms.    Observe the site frequently.  If bleeding or a large lump (the size of a golf ball or bigger) occurs lie flat, apply continuous direct pressure just above the puncture site for at least 10 minutes, and notify your physician immediately.  If the bleeding cannot be controlled, call 911 immediately for assistance.  Notify your physician of pain, swelling or any drainage.    Notify your physician immediately if coldness, numbness, discoloration or pain in your foot occurs.  .[email or web address]

## 2023-01-19 NOTE — DISCHARGE NOTE PROVIDER - CARE PROVIDER_API CALL
Solomon Pascual)  Cardiovascular Disease; Internal Medicine  [address] [facility], [place] [address]  Phone: [phone]  Fax: [phone]  Follow Up Time: 1 week   Khanh Ward)  [facility]  [address] [place] [address]  Phone: [phone]  Fax: [phone]  Scheduled Appointment: 02/03/2023   Khanh guevara  [address] [place], [place] [address]  Phone: [phone]  Fax: [phone]  Scheduled Appointment: 02/03/2023 10:20 AM

## 2023-01-19 NOTE — DISCHARGE NOTE PROVIDER - PROVIDER TOKENS
PROVIDER:[TOKEN:[[id number]],FOLLOWUP:[1 week]] PROVIDER:[TOKEN:[[id number]],SCHEDULEDAPPT:[02/03/2023]] FREE:[LAST:[maini],FIRST:[Khanh],PHONE:[phone]],FAX:[phone]],ADDRESS:[[address] [place] [address]],SCHEDULEDAPPT:[02/03/2023],SCHEDULEDAPPTTIME:[10:20 AM]]

## 2023-01-19 NOTE — CONSULT NOTE ADULT - NS ATTEND AMEND GEN_ALL_CORE FT
82M hx neuropathy, GERD and renal stones who presents to [facility] ED with complains of new onset chest pain 3/10 starting this morning at 7am while walking and associated with SOB and radiation to right arm and jaw. ECG ST T abnl. Serial CE, EKG, TTE and if normal then NST today. 82M hx neuropathy, GERD and renal stones who presents to [facility] ED with complains of new onset chest pain 3/10 starting this morning at 7am while walking and associated with SOB and radiation to right arm and jaw. ECG ST T abnl. Serial CE, EKG, TTE, Torp +: LHC today 82M hx neuropathy, GERD and renal stones who presents to [facility] ED with complains of new onset chest pain 3/10 starting this morning at 7am while walking and associated with SOB and radiation to right arm and jaw. ECG ST T abnl.   ACS/NSTEMI: Serial CE, EKG, TTE, Torp +: LHC today 82M hx neuropathy, GERD and renal stones who presents to [facility] ED with complains of new onset chest pain 3/10 starting this morning at 7am while walking and associated with SOB and radiation to right arm and jaw. ECG ST T abnl.   Addendum 1/19/2022:   ACS/NSTEMI  Troponin elevation 0.02->3.81   EKG done STAT - ST depressions in V1-V6, now  with possible J point elevations.  EKG reviewed with Dr. Angeles, added for urgent LHC today.   Given 324 ASA.  give Plavix 600mg PO STAT.  start heparin GTT ACS nomogram.  keep NPO, plan for urgent LHC today.

## 2023-01-19 NOTE — ED ADULT TRIAGE NOTE - CHIEF COMPLAINT QUOTE
Pt arrives to ED c/o mid sternal chest pain radiating to the jaw - symptoms started 7 am today. Pt denies any cardiac HX

## 2023-01-19 NOTE — ED PROVIDER NOTE - PHYSICAL EXAMINATION
PHYSICAL EXAM:  Constitutional: Sitting comfortably, well-appearing, no acute distress  HENMT: Normocephalic, atraumatic  Respiratory: Lungs clear to ausculation bilaterally. No wheezes, stridor, or crackles. No tachypnea or increased work of breathing  Cardiovascular: Normal rate, regular rhythm, normal S1 and S2  Gastrointestinal: Abdomen soft, non-distended, non-tender, intact bowel sounds  Neurological: Cranial nerves grossly intact. No focal deficits. Appears at baseline  Musculoskeletal: Moves all extremities spontaneously without limitation. No gross deformities or motor deficits

## 2023-01-19 NOTE — DISCHARGE NOTE PROVIDER - NSDCCPCAREPLAN_GEN_ALL_CORE_FT
PRINCIPAL DISCHARGE DIAGNOSIS  Diagnosis: NSTEMI (non-ST elevation myocardial infarction)  Assessment and Plan of Treatment: Coronary artery disease is the buildup of plaque in the arteries that supply oxygen-rich blood to your heart. Plaque causes a narrowing or blockage that could result in a heart attack. Symptoms include chest pain or discomfort, shortness of breath, dizziness, palpitations, fatigue or reduced exercise tolerance. .  Go to the ED with any acute onset of chest pain, palpitations, shortness of breath or dizziness. Do NOT miss a dose or stop taking your Aspirin and ***, these medications keep your stent open and prevent a heart attack. If anyone tells you to stop these medications, speak to your cardiologist immediately.  Managing risk factors will help keep your stent open and prevent future blockages, risk factors may include: high blood pressure, high cholesterol, diabetes, obesity, sedentary life style and smoking.    Your diet should be low in fat, cholesterol, salt and carbohydrates, increase fruits (caution if diabetic), vegetables and whole grains/fiber rich foods.   Take all your cardiac  medications as prescribed.    ***access  Exercise is a very important factor in heart health. Once your post procedure restrictions have passed, you should engage in heart healthy, aerobic exercise. Be sure to have clearance from your cardiologist. Cardiac rehab programs could be extremely beneficial and your cardiologist could help set this up.   Follow up with your cardiologist within 1-2 weeks after your procedure.   Call your cardiologist or our unit ([phone]) with any questions or concerns that may arise.

## 2023-01-19 NOTE — H&P ADULT - NSHPREVIEWOFSYSTEMS_GEN_ALL_CORE
Review of Systems:  CONSTITUTIONAL: No weakness, fevers or chills  EYES/ENT: No visual changes;  No vertigo or throat pain   NECK: No pain or stiffness  RESPIRATORY: No cough, wheezing, hemoptysis;    CARDIOVASCULAR: +Chest pain  GASTROINTESTINAL: No abdominal or epigastric pain. No nausea, vomiting, or hematemesis; No diarrhea or constipation.   GENITOURINARY: No dysuria, frequency or hematuria  NEUROLOGICAL: No numbness or weakness  SKIN: No itching, burning, rashes, or lesions   All other review of systems is negative unless indicated above

## 2023-01-19 NOTE — ED PROVIDER NOTE - CLINICAL SUMMARY MEDICAL DECISION MAKING FREE TEXT BOX
Pt is an 81yo m with PMH of gastritis and kidney stones presenting today with bilateral chest pain radiating to the jaw and right arm x 1 hour. Pt reports pain started at 7AM this morning and is dull. VSS, EKG with depressions in anterior leads, oropharynx clear, lungs ctab, belly soft, no edema.    Will give aspirin , check labs, evaluate for ACS with troponin repeat EKG, consult cardiology, evaluate for URI vs. PNA with CXR viral swab, reassess.

## 2023-01-19 NOTE — CONSULT NOTE ADULT - PROBLEM SELECTOR RECOMMENDATION 9
.  - chest pain 3/10 with radiation to jaw and right arm  - EKG with ST depression in V1-V6  -  given x1  - montior on Tele for acute arrhythmia monitoring  - check labs including CBC, BMP, trend CE x3, ECG and CXR, monitor vitals per protocol  - trop neg x 1 continue to trend  - pending TTE for evaluation of cardiac function and any valvular abnormalities  - further workup pending results of TTE, will plan for possible NST today if isotope available

## 2023-01-19 NOTE — CONSULT NOTE ADULT - SUBJECTIVE AND OBJECTIVE BOX
[facility]                                              CARDIOLOGY AT [facility]                                                   [address] [facility], [place] [address]                                             Telephone: [phone]. Fax:[phone]                                                       CARDIOLOGY CONSULTATION NOTE                                                                                             History obtained by: Patient and medical record  Community Cardiologist: none, has not seen in 20+ years, PCP Dr. Larson   obtained: Yes [  ] No [x  ]  Reason for Consultation: chest pain  Available out pt records reviewed: Yes [x  ] No [  ]    Chief complaint:    Patient is a 82y old  Male who presents with a chief complaint of     HPI:  81 y/o M with PMH neuropathy, SHASHANK and renal stones who presents to [facility] ED with complains of new onset chest pain 3/10 starting this morning at 7am while walking and associated with SOB and radiation to right arm and jaw. He last had a cardiac workup greater than 20 years ago. In ED, EKG with ST depressions in V1-V6 which patient states "might have been there previously". He last saw Dr. Larson 2 weeks ago, EKG done at that time. He endorses SOB while climbing stairs, denies any family hx of cardiac disease. /86, 324mg ASA given. Plan for TTE and NST today. Denies back pain, headache, dizziness,  ALVES, diaphoresis, syncope or N/V.        CARDIAC TESTING   ECHO: pending    STRESS: pending     CATH:     ELECTROPHYSIOLOGY:     PAST MEDICAL HISTORY  Kidney stones  Gastritis    PAST SURGICAL HISTORY  Total knee replacement status, right  S/P total knee replacement, left        SOCIAL HISTORY:  Denies smoking/alcohol/drugs  CIGARETTES:     ALCOHOL:  DRUGS:    FAMILY HISTORY:  No pertinent family history in first degree relatives      Family History of Cardiovascular Disease:  Yes [  ] No [ x ]  Coronary Artery Disease in first degree relative: Yes [  ] No [x  ]  Sudden Cardiac Death in First degree relative: Yes [  ] No [x  ]    HOME MEDICATIONS:      CURRENT CARDIAC MEDICATIONS:      CURRENT OTHER MEDICATIONS:      ALLERGIES:   No Known Allergies      REVIEW OF SYMPTOMS:   CONSTITUTIONAL: No fever, no chills, no weight loss, no weight gain, no fatigue   ENMT:  No vertigo; No sinus or throat pain  NECK: No pain or stiffness  CARDIOVASCULAR: +chest pain, no dyspnea, no syncope/presyncope, no palpitations, no dizziness, no Orthopnea, no Paroxsymal nocturnal dyspnea  RESPIRATORY: +Shortness of breath, no cough, no wheezing  : No dysuria, no hematuria   GI: No dark color stool, no nausea, no diarrhea, no constipation, no abdominal pain   NEURO: No headache, no slurred speech   MUSCULOSKELETAL: No joint pain or swelling; No muscle, back, or extremity pain  PSYCH: No agitation, no anxiety.    ALL OTHER REVIEW OF SYSTEMS ARE NEGATIVE.    VITAL SIGNS:  T(C): 36.7 (01-19-23 @ 07:51), Max: 36.7 (01-19-23 @ 07:51)  T(F): 98 (01-19-23 @ 07:51), Max: 98 (01-19-23 @ 07:51)  HR: 77 (01-19-23 @ 07:51) (77 - 77)  BP: 158/86 (01-19-23 @ 07:51) (158/86 - 158/86)  RR: 20 (01-19-23 @ 07:51) (20 - 20)  SpO2: 98% (01-19-23 @ 07:51) (98% - 98%)    INTAKE AND OUTPUT:       PHYSICAL EXAM:  Constitutional: Comfortable . No acute distress.   HEENT: Atraumatic and normocephalic , neck is supple . no JVD. No carotid bruit.  CNS: A&Ox3. No focal deficits.   Respiratory: CTAB, unlabored   Cardiovascular: RRR normal s1 s2. No murmur. No rubs or gallop.  Gastrointestinal: Soft, non-tender. +Bowel sounds.   Extremities: 2+ Peripheral Pulses, No clubbing, cyanosis, or edema  Psychiatric: Calm . no agitation.   Skin: Warm and dry, no ulcers on extremities     LABS:  ( 19 Jan 2023 09:12 )  Troponin T  0.02 ,  CPK  X    , CKMB  X    , BNP 64                                 15.4   9.21  )-----------( 297      ( 19 Jan 2023 09:12 )             46.6     01-19    140  |  105  |  22.9<H>  ----------------------------<  120<H>  4.2   |  26.0  |  1.24    Ca    8.7      19 Jan 2023 09:12    TPro  7.0  /  Alb  3.9  /  TBili  0.3<L>  /  DBili  x   /  AST  23  /  ALT  13  /  AlkPhos  106  01-19    PT/INR - ( 19 Jan 2023 09:12 )   PT: 11.7 sec;   INR: 1.01 ratio         PTT - ( 19 Jan 2023 09:12 )  PTT:28.8 sec            INTERPRETATION OF TELEMETRY:     ECG: NSR with STD v1-v6  Prior ECG: Yes [  ] No [  ]    RADIOLOGY & ADDITIONAL STUDIES:    X-ray:    CT scan:   MRI:   US:

## 2023-01-19 NOTE — ED ADULT NURSE NOTE - OBJECTIVE STATEMENT
patient c/o chest pain since 7am today, midsternum radiating across. patient denies cardiac hx. hx of GERD, neuropathy.

## 2023-01-19 NOTE — PROGRESS NOTE ADULT - SUBJECTIVE AND OBJECTIVE BOX
Pt received from the ED for a LHC.    Pt presented to the ED with C/O CP which started this morning radiating to the right arm and jaw and was associated with SOB. PMHx is negative for CAD, HTN, HLD or DM . PMHx is positive for gastritis and renal calculi and a L TKR.  His first troponin was negative and presenting EKG SR with LAD and APCs.  Second troponin set 3.81 and EKG had inverted T waves in V1 and Tall tented T waves with ST changes in V2 through V5.  At approx 1400 pt noted to have a 10 beat run of VT followed shortly thereafter by a 15 beat run.  Pt was given 324 ASA at 0900 and 600 mg Plavix at 1400 and sent to the cath holding room for an urgent LHC.      Pt denies current CP, palpitations or SOB.    Pt A&O x 3  Lungs CTA  S1S2 no MRG  Hypoactive BS  Telemetry reveals Sinus Rhythm with PVCs  B/P 168/65        LABS:                          15.4   9.21  )-----------( 297      ( 19 Jan 2023 09:12 )             46.6     01-19    140  |  105  |  22.9<H>  ----------------------------<  120<H>  4.2   |  26.0  |  1.24    Ca    8.7      19 Jan 2023 09:12    TPro  7.0  /  Alb  3.9  /  TBili  0.3<L>  /  DBili  x   /  AST  23  /  ALT  13  /  AlkPhos  106  01-19    LIVER FUNCTIONS - ( 19 Jan 2023 09:12 )  Alb: 3.9 g/dL / Pro: 7.0 g/dL / ALK PHOS: 106 U/L / ALT: 13 U/L / AST: 23 U/L / GGT: x           PT/INR - ( 19 Jan 2023 09:12 )   PT: 11.7 sec;   INR: 1.01 ratio         PTT - ( 19 Jan 2023 09:12 )  PTT:28.8 sec    Troponin T, Serum (01.19.23 @ 09:12)   Troponin T, Serum: 0.02 ng/mL Troponin T, Serum (01.19.23 @ 13:00)   Troponin T, Serum: 3.81: TYPE:(C=Critical, N=Notification, A=Abnormal) N     < from: 12 Lead ECG (01.19.23 @ 07:55) >  Diagnosis Line *** Poor data quality, interpretation may be adversely affected  Sinus rhythm with Premature atrial complexes  Minimal voltage criteria for LVH, may be normal variant  Nonspecific ST abnormality  Abnormal ECG    Confirmed by TOBIAS SALINAS ([id number]) on 1/19/2023 2:59:53 PM    < end of copied text >      < from: 12 Lead ECG (01.19.23 @ 13:52) >  Diagnosis Line Sinus rhythm with occasional Premature ventricular complexes  Increased R/S ratio in V1, consider early transition or posterior infarct    Confirmed by TOBIAS SALINAS ([id number]) on 1/19/2023 3:00:12 PM    < end of copied text >   Pt received from the ED for a LHC.    Pt presented to the ED with C/O CP which started this morning radiating to the right arm and jaw and was associated with SOB. PMHx is negative for CAD, HTN, HLD or DM . PMHx is positive for gastritis and renal calculi and a L TKR.  His first troponin was negative and presenting EKG SR with LAD and APCs.  Second troponin set 3.81 and EKG had inverted T waves in V1 and Tall tented T waves with ST changes in V2 through V5.  At approx 1400 pt noted to have a 10 beat run of VT followed shortly thereafter by a 15 beat run.  Pt was given 324 ASA at 0900 and 600 mg Plavix at 1400 and sent to the cath holding room for an urgent LHC.      Pt denies current CP, palpitations or SOB.    Pt A&O x 3  Lungs CTA  S1S2 no MRG  Hypoactive BS  Telemetry reveals Sinus Rhythm with PVCs  B/P 168/65    .    LABS:                          15.4   9.21  )-----------( 297      ( 19 Jan 2023 09:12 )             46.6     01-19    140  |  105  |  22.9<H>  ----------------------------<  120<H>  4.2   |  26.0  |  1.24    Ca    8.7      19 Jan 2023 09:12    TPro  7.0  /  Alb  3.9  /  TBili  0.3<L>  /  DBili  x   /  AST  23  /  ALT  13  /  AlkPhos  106  01-19    LIVER FUNCTIONS - ( 19 Jan 2023 09:12 )  Alb: 3.9 g/dL / Pro: 7.0 g/dL / ALK PHOS: 106 U/L / ALT: 13 U/L / AST: 23 U/L / GGT: x           PT/INR - ( 19 Jan 2023 09:12 )   PT: 11.7 sec;   INR: 1.01 ratio         PTT - ( 19 Jan 2023 09:12 )  PTT:28.8 sec    Troponin T, Serum (01.19.23 @ 09:12)   Troponin T, Serum: 0.02 ng/mL Troponin T, Serum (01.19.23 @ 13:00)   Troponin T, Serum: 3.81: TYPE:(C=Critical, N=Notification, A=Abnormal) N     < from: 12 Lead ECG (01.19.23 @ 07:55) >  Diagnosis Line *** Poor data quality, interpretation may be adversely affected  Sinus rhythm with Premature atrial complexes  Minimal voltage criteria for LVH, may be normal variant  Nonspecific ST abnormality  Abnormal ECG    Confirmed by TOBIAS SALINAS ([id number]) on 1/19/2023 2:59:53 PM    < end of copied text >      < from: 12 Lead ECG (01.19.23 @ 13:52) >  Diagnosis Line Sinus rhythm with occasional Premature ventricular complexes  Increased R/S ratio in V1, consider early transition or posterior infarct    Confirmed by TOBIAS SALINAS ([id number]) on 1/19/2023 3:00:12 PM    < end of copied text >

## 2023-01-20 ENCOUNTER — TRANSCRIPTION ENCOUNTER (OUTPATIENT)
Age: 83
End: 2023-01-20

## 2023-01-20 VITALS
DIASTOLIC BLOOD PRESSURE: 56 MMHG | HEART RATE: 62 BPM | OXYGEN SATURATION: 97 % | SYSTOLIC BLOOD PRESSURE: 108 MMHG | RESPIRATION RATE: 16 BRPM

## 2023-01-20 LAB
A1C WITH ESTIMATED AVERAGE GLUCOSE RESULT: 5.6 % — SIGNIFICANT CHANGE UP (ref 4–5.6)
ALBUMIN SERPL ELPH-MCNC: 3.3 G/DL — SIGNIFICANT CHANGE UP (ref 3.3–5.2)
ALP SERPL-CCNC: 103 U/L — SIGNIFICANT CHANGE UP (ref 40–120)
ALT FLD-CCNC: 35 U/L — SIGNIFICANT CHANGE UP
ANION GAP SERPL CALC-SCNC: 9 MMOL/L — SIGNIFICANT CHANGE UP (ref 5–17)
AST SERPL-CCNC: 142 U/L — HIGH
BASOPHILS # BLD AUTO: 0.01 K/UL — SIGNIFICANT CHANGE UP (ref 0–0.2)
BASOPHILS NFR BLD AUTO: 0.1 % — SIGNIFICANT CHANGE UP (ref 0–2)
BILIRUB SERPL-MCNC: 0.8 MG/DL — SIGNIFICANT CHANGE UP (ref 0.4–2)
BUN SERPL-MCNC: 20.4 MG/DL — HIGH (ref 8–20)
CALCIUM SERPL-MCNC: 8.4 MG/DL — SIGNIFICANT CHANGE UP (ref 8.4–10.5)
CHLORIDE SERPL-SCNC: 104 MMOL/L — SIGNIFICANT CHANGE UP (ref 96–108)
CHOLEST SERPL-MCNC: 148 MG/DL — SIGNIFICANT CHANGE UP
CO2 SERPL-SCNC: 24 MMOL/L — SIGNIFICANT CHANGE UP (ref 22–29)
CREAT SERPL-MCNC: 1.07 MG/DL — SIGNIFICANT CHANGE UP (ref 0.5–1.3)
EGFR: 70 ML/MIN/1.73M2 — SIGNIFICANT CHANGE UP
EOSINOPHIL # BLD AUTO: 0.16 K/UL — SIGNIFICANT CHANGE UP (ref 0–0.5)
EOSINOPHIL NFR BLD AUTO: 1.5 % — SIGNIFICANT CHANGE UP (ref 0–6)
ESTIMATED AVERAGE GLUCOSE: 114 MG/DL — SIGNIFICANT CHANGE UP (ref 68–114)
GLUCOSE SERPL-MCNC: 110 MG/DL — HIGH (ref 70–99)
HCT VFR BLD CALC: 44.1 % — SIGNIFICANT CHANGE UP (ref 39–50)
HDLC SERPL-MCNC: 33 MG/DL — LOW
HGB BLD-MCNC: 14.6 G/DL — SIGNIFICANT CHANGE UP (ref 13–17)
IMM GRANULOCYTES NFR BLD AUTO: 0.5 % — SIGNIFICANT CHANGE UP (ref 0–0.9)
LIPID PNL WITH DIRECT LDL SERPL: 95 MG/DL — SIGNIFICANT CHANGE UP
LYMPHOCYTES # BLD AUTO: 0.91 K/UL — LOW (ref 1–3.3)
LYMPHOCYTES # BLD AUTO: 8.5 % — LOW (ref 13–44)
MCHC RBC-ENTMCNC: 29.1 PG — SIGNIFICANT CHANGE UP (ref 27–34)
MCHC RBC-ENTMCNC: 33.1 GM/DL — SIGNIFICANT CHANGE UP (ref 32–36)
MCV RBC AUTO: 87.8 FL — SIGNIFICANT CHANGE UP (ref 80–100)
MONOCYTES # BLD AUTO: 0.92 K/UL — HIGH (ref 0–0.9)
MONOCYTES NFR BLD AUTO: 8.6 % — SIGNIFICANT CHANGE UP (ref 2–14)
NEUTROPHILS # BLD AUTO: 8.7 K/UL — HIGH (ref 1.8–7.4)
NEUTROPHILS NFR BLD AUTO: 80.8 % — HIGH (ref 43–77)
NON HDL CHOLESTEROL: 115 MG/DL — SIGNIFICANT CHANGE UP
PLATELET # BLD AUTO: 302 K/UL — SIGNIFICANT CHANGE UP (ref 150–400)
POTASSIUM SERPL-MCNC: 4.2 MMOL/L — SIGNIFICANT CHANGE UP (ref 3.5–5.3)
POTASSIUM SERPL-SCNC: 4.2 MMOL/L — SIGNIFICANT CHANGE UP (ref 3.5–5.3)
PROT SERPL-MCNC: 6.4 G/DL — LOW (ref 6.6–8.7)
RBC # BLD: 5.02 M/UL — SIGNIFICANT CHANGE UP (ref 4.2–5.8)
RBC # FLD: 13.3 % — SIGNIFICANT CHANGE UP (ref 10.3–14.5)
SODIUM SERPL-SCNC: 137 MMOL/L — SIGNIFICANT CHANGE UP (ref 135–145)
TRIGL SERPL-MCNC: 100 MG/DL — SIGNIFICANT CHANGE UP
TROPONIN T SERPL-MCNC: 3.39 NG/ML — HIGH (ref 0–0.06)
WBC # BLD: 10.75 K/UL — HIGH (ref 3.8–10.5)
WBC # FLD AUTO: 10.75 K/UL — HIGH (ref 3.8–10.5)

## 2023-01-20 PROCEDURE — 36415 COLL VENOUS BLD VENIPUNCTURE: CPT

## 2023-01-20 PROCEDURE — 85025 COMPLETE CBC W/AUTO DIFF WBC: CPT

## 2023-01-20 PROCEDURE — 93458 L HRT ARTERY/VENTRICLE ANGIO: CPT | Mod: XU

## 2023-01-20 PROCEDURE — 99239 HOSP IP/OBS DSCHRG MGMT >30: CPT

## 2023-01-20 PROCEDURE — C8929: CPT

## 2023-01-20 PROCEDURE — 93010 ELECTROCARDIOGRAM REPORT: CPT

## 2023-01-20 PROCEDURE — 87637 SARSCOV2&INF A&B&RSV AMP PRB: CPT

## 2023-01-20 PROCEDURE — C1874: CPT

## 2023-01-20 PROCEDURE — C9606: CPT | Mod: LC

## 2023-01-20 PROCEDURE — 80061 LIPID PANEL: CPT

## 2023-01-20 PROCEDURE — 93005 ELECTROCARDIOGRAM TRACING: CPT

## 2023-01-20 PROCEDURE — 85610 PROTHROMBIN TIME: CPT

## 2023-01-20 PROCEDURE — 86850 RBC ANTIBODY SCREEN: CPT

## 2023-01-20 PROCEDURE — 85730 THROMBOPLASTIN TIME PARTIAL: CPT

## 2023-01-20 PROCEDURE — 83036 HEMOGLOBIN GLYCOSYLATED A1C: CPT

## 2023-01-20 PROCEDURE — C1753: CPT

## 2023-01-20 PROCEDURE — C1887: CPT

## 2023-01-20 PROCEDURE — 84484 ASSAY OF TROPONIN QUANT: CPT

## 2023-01-20 PROCEDURE — 71045 X-RAY EXAM CHEST 1 VIEW: CPT

## 2023-01-20 PROCEDURE — C1725: CPT

## 2023-01-20 PROCEDURE — 92978 ENDOLUMINL IVUS OCT C 1ST: CPT | Mod: LC

## 2023-01-20 PROCEDURE — 86900 BLOOD TYPING SEROLOGIC ABO: CPT

## 2023-01-20 PROCEDURE — 99232 SBSQ HOSP IP/OBS MODERATE 35: CPT

## 2023-01-20 PROCEDURE — 99285 EMERGENCY DEPT VISIT HI MDM: CPT

## 2023-01-20 PROCEDURE — 86901 BLOOD TYPING SEROLOGIC RH(D): CPT

## 2023-01-20 PROCEDURE — 83880 ASSAY OF NATRIURETIC PEPTIDE: CPT

## 2023-01-20 PROCEDURE — C1894: CPT

## 2023-01-20 PROCEDURE — C1760: CPT

## 2023-01-20 PROCEDURE — C1769: CPT

## 2023-01-20 PROCEDURE — 80053 COMPREHEN METABOLIC PANEL: CPT

## 2023-01-20 RX ORDER — METOPROLOL TARTRATE 50 MG
25 TABLET ORAL DAILY
Refills: 0 | Status: DISCONTINUED | OUTPATIENT
Start: 2023-01-20 | End: 2023-01-20

## 2023-01-20 RX ORDER — LOSARTAN POTASSIUM 100 MG/1
1 TABLET, FILM COATED ORAL
Qty: 90 | Refills: 0
Start: 2023-01-20 | End: 2023-04-19

## 2023-01-20 RX ORDER — LOSARTAN POTASSIUM 100 MG/1
0.5 TABLET, FILM COATED ORAL
Qty: 0 | Refills: 0 | DISCHARGE
Start: 2023-01-20 | End: 2023-04-19

## 2023-01-20 RX ORDER — TICAGRELOR 90 MG/1
1 TABLET ORAL
Qty: 180 | Refills: 3
Start: 2023-01-20 | End: 2024-01-14

## 2023-01-20 RX ORDER — ATORVASTATIN CALCIUM 80 MG/1
1 TABLET, FILM COATED ORAL
Qty: 90 | Refills: 0
Start: 2023-01-20 | End: 2023-04-19

## 2023-01-20 RX ORDER — TAMSULOSIN HYDROCHLORIDE 0.4 MG/1
2 CAPSULE ORAL
Qty: 0 | Refills: 0 | DISCHARGE
Start: 2023-01-20

## 2023-01-20 RX ORDER — METOPROLOL TARTRATE 50 MG
0.5 TABLET ORAL
Qty: 90 | Refills: 3
Start: 2023-01-20 | End: 2024-01-14

## 2023-01-20 RX ORDER — ASPIRIN/CALCIUM CARB/MAGNESIUM 324 MG
1 TABLET ORAL
Qty: 90 | Refills: 0
Start: 2023-01-20 | End: 2023-04-19

## 2023-01-20 RX ORDER — LANOLIN ALCOHOL/MO/W.PET/CERES
1 CREAM (GRAM) TOPICAL
Qty: 0 | Refills: 0 | DISCHARGE
Start: 2023-01-20

## 2023-01-20 RX ADMIN — PANTOPRAZOLE SODIUM 40 MILLIGRAM(S): 20 TABLET, DELAYED RELEASE ORAL at 06:06

## 2023-01-20 RX ADMIN — TICAGRELOR 90 MILLIGRAM(S): 90 TABLET ORAL at 06:06

## 2023-01-20 RX ADMIN — LOSARTAN POTASSIUM 25 MILLIGRAM(S): 100 TABLET, FILM COATED ORAL at 06:06

## 2023-01-20 RX ADMIN — Medication 12.5 MILLIGRAM(S): at 06:07

## 2023-01-20 RX ADMIN — Medication 75 MILLIGRAM(S): at 06:06

## 2023-01-20 NOTE — PROGRESS NOTE ADULT - SUBJECTIVE AND OBJECTIVE BOX
[facility]                                                         CARDIOLOGY AT [facility]                                                                  [address], [place] [address]                                                         Telephone: [phone]. Fax:[phone]                                                                             PROGRESS NOTE    Reason for follow up: NSTEMI  Update: s/p PCI OM1  will need staged PCI LAD and prox RCA      Review of symptoms:   Cardiac:  No chest pain. No dyspnea. No palpitations.  Respiratory: no cough. No dyspnea  Gastrointestinal: No diarrhea. No abdominal pain. No bleeding.   Neuro: No focal neuro complaints.      Vitals:  T(C): 36.4 (01-20-23 @ 06:05), Max: 36.8 (01-19-23 @ 11:38)  HR: 61 (01-20-23 @ 06:05) (61 - 89)  BP: 127/67 (01-20-23 @ 06:05) (127/67 - 168/95)  RR: 17 (01-20-23 @ 06:05) (17 - 20)  SpO2: 98% (01-19-23 @ 22:00) (95% - 98%)          I&O's Summary    19 Jan 2023 07:01  -  20 Jan 2023 07:00  --------------------------------------------------------  IN: 0 mL / OUT: 650 mL / NET: -650 mL        Weight (kg): 86.2 (01-19 @ 07:51)        PHYSICAL EXAM:  Appearance: Comfortable. No acute distress  HEENT:  Atraumatic. Normocephalic.  Normal oral mucosa  Neurologic: A & O x 3, no gross focal deficits.  Cardiovascular: RRR S1 S2, No murmur, no rubs/gallops. No JVD  Respiratory: Lungs clear to auscultation, unlabored   Gastrointestinal:  Soft, Non-tender, + BS  Lower Extremities: 2+ Peripheral Pulses, No edema  Psychiatry: Patient is calm. No agitation.   Skin: warm and dry.          CURRENT CARDIAC MEDICATIONS:  losartan 25 milliGRAM(s) Oral daily  metoprolol tartrate 12.5 milliGRAM(s) Oral two times a day        CURRENT OTHER MEDICATIONS:  acetaminophen     Tablet .. 650 milliGRAM(s) Oral every 6 hours PRN Temp greater or equal to 38C (100.4F), Mild Pain (1 - 3)  melatonin 3 milliGRAM(s) Oral at bedtime PRN Insomnia  ondansetron Injectable 4 milliGRAM(s) IV Push every 8 hours PRN Nausea and/or Vomiting  pregabalin 75 milliGRAM(s) Oral two times a day  zolpidem 5 milliGRAM(s) Oral at bedtime PRN Insomnia  zolpidem 5 milliGRAM(s) Oral at bedtime PRN Insomnia  pantoprazole    Tablet 40 milliGRAM(s) Oral before breakfast  atorvastatin 40 milliGRAM(s) Oral at bedtime  finasteride 5 milliGRAM(s) Oral daily  aspirin  chewable 81 milliGRAM(s) Oral daily  heparin   Injectable 5300 Unit(s) IV Push every 6 hours PRN For aPTT less than 40  heparin  Infusion.  Unit(s)/Hr (10 mL/Hr) IV Continuous <Continuous>  tamsulosin 0.8 milliGRAM(s) Oral at bedtime  ticagrelor 90 milliGRAM(s) Oral every 12 hours        LABS:	 	  ( 20 Jan 2023 04:36 )  Troponin T  3.39<H>,  CPK  X    , CKMB  X    , BNP X      ( 19 Jan 2023 13:00 )  Troponin T  3.81<H>,  CPK  X    , CKMB  X    , BNP X      ( 19 Jan 2023 09:12 )  Troponin T  0.02 ,  CPK  X    , CKMB  X    , BNP 64                               14.6   10.75 )-----------( 302      ( 20 Jan 2023 04:36 )             44.1     01-20    137  |  104  |  20.4<H>  ----------------------------<  110<H>  4.2   |  24.0  |  1.07    Ca    8.4      20 Jan 2023 04:36    TPro  6.4<L>  /  Alb  3.3  /  TBili  0.8  /  DBili  x   /  AST  142<H>  /  ALT  35  /  AlkPhos  103  01-20    PT/INR/PTT ( 19 Jan 2023 09:12 )                       :                       :      11.7         :       28.8                  .        .                   .              .           .       1.01        .                                       Lipid Profile: Date: 01-20 @ 04:36  Total cholesterol 148; Direct LDL: --; HDL: 33; Triglycerides:100      TELEMETRY: SR, NSVT 3 beats      DIAGNOSTIC TESTING:  [ ] Echocardiogram:   results pending    CATH:  Myocardial infarction without ST elevation (NSTEMI)  ACS Less than 24 hours  CCS Class IV  PCI Status: urgent  Conclusions:  1. Successful IVUS-guided PCI of OM1 culprit lesion for nstemi (2.25x23 post dilated 2.5 high pressure NC)  2. Residual mid LAD and prox RCA lesions.  ****OF NOTE, radial engagement difficult due to subclavian/aortic tortuosity.****  Recommendations:  1. needs beta blocker. DAPT, statin, BP control. Monitor overnight. Formal TTE. IV saline.  2. Stage PCI of LAD and RCA for complete revascularization.  Acute complication: No complications

## 2023-01-20 NOTE — DISCHARGE NOTE NURSING/CASE MANAGEMENT/SOCIAL WORK - NSDCPETBCESMAN_GEN_ALL_CORE
If you are a smoker, it is important for your health to stop smoking. Please be aware that second hand smoke is also harmful.
Patient/surrogate refused vaccine...

## 2023-01-20 NOTE — PROGRESS NOTE ADULT - ASSESSMENT
83 yo male admitted with NSTEMI and developing ST changes in the anterior and lateral leads.      ASA 3  Mallampati 2  GFR 59  Creat1.24  Bleeding Risk 1.2%  COVID19 - Negative 1/19/23
81 y/o M with PMH neuropathy, SHASHANK and renal stones who presents to [facility] ED with complains of new onset chest pain 3/10 starting this morning at 7am while walking and associated with SOB and radiation to right arm and jaw. He last had a cardiac workup greater than 20 years ago. In ED, EKG with ST depressions in V1-V6 which patient states "might have been there previously". He last saw Dr. Larson 2 weeks ago, EKG done at that time. He endorses SOB while climbing stairs, denies any family hx of cardiac disease. /86, 324mg ASA given. Plan for TTE and NST today. Denies back pain, headache, dizziness,  ALVES, diaphoresis, syncope or N/V.

## 2023-01-20 NOTE — PROGRESS NOTE ADULT - SUBJECTIVE AND OBJECTIVE BOX
Nurse Practitioner Progress note:   s/p [facility] with successful PCI and STANLEY to the OM1 by Dr Ward  Residual 70% occlusion of the RCA and 70% occlusion of the LAD to be addressed as an outpatient staged procedure.      Patient feels well.  Denies chest pain, shortness of breath, dizziness or palpitations at this time.    Right groin procedure site CDI.  No bleeding, no hematoma, site soft, non tender, positive pedal pulses bilaterally  Right radial procedure site CDI, no bleeding, no hematoma, able to move all digits with capillary refill < 3 seconds, fingers warm  Dressings to both sites removed. Site instructions given to patient and pt expressed understanding.       T(C): 36.4 (01-20-23 @ 06:05), Max: 36.8 (01-19-23 @ 11:38)  HR: 61 (01-20-23 @ 06:05) (61 - 89)  BP: 127/67 (01-20-23 @ 06:05) (127/67 - 168/95)  RR: 17 (01-20-23 @ 06:05) (17 - 20)  SpO2: 98% (01-19-23 @ 22:00) (95% - 98%)    CBC Full  -  ( 20 Jan 2023 04:36 )  WBC Count : 10.75 K/uL  RBC Count : 5.02 M/uL  Hemoglobin : 14.6 g/dL  Hematocrit : 44.1 %  Platelet Count - Automated : 302 K/uL  Mean Cell Volume : 87.8 fl  Mean Cell Hemoglobin : 29.1 pg  Mean Cell Hemoglobin Concentration : 33.1 gm/dL  Auto Neutrophil # : 8.70 K/uL  Auto Lymphocyte # : 0.91 K/uL  Auto Monocyte # : 0.92 K/uL  Auto Eosinophil # : 0.16 K/uL  Auto Basophil # : 0.01 K/uL  Auto Neutrophil % : 80.8 %  Auto Lymphocyte % : 8.5 %  Auto Monocyte % : 8.6 %  Auto Eosinophil % : 1.5 %  Auto Basophil % : 0.1 %    01-20    137  |  104  |  20.4<H>  ----------------------------<  110<H>  4.2   |  24.0  |  1.07    Ca    8.4      20 Jan 2023 04:36    TPro  6.4<L>  /  Alb  3.3  /  TBili  0.8  /  DBili  x   /  AST  142<H>  /  ALT  35  /  AlkPhos  103  01-20    CARDIAC MARKERS ( 20 Jan 2023 04:36 )  x     / 3.39 ng/mL / x     / x     / x      CARDIAC MARKERS ( 19 Jan 2023 13:00 )  x     / 3.81 ng/mL / x     / x     / x      CARDIAC MARKERS ( 19 Jan 2023 09:12 )  x     / 0.02 ng/mL / x     / x     / x        EKG: Sinus Rhythm with LAD       MEDICATIONS  (STANDING):  aspirin  chewable 81 milliGRAM(s) Oral daily  atorvastatin 40 milliGRAM(s) Oral at bedtime  finasteride 5 milliGRAM(s) Oral daily  heparin  Infusion.  Unit(s)/Hr (10 mL/Hr) IV Continuous <Continuous>  losartan 25 milliGRAM(s) Oral daily  metoprolol tartrate 12.5 milliGRAM(s) Oral two times a day  pantoprazole    Tablet 40 milliGRAM(s) Oral before breakfast  pregabalin 75 milliGRAM(s) Oral two times a day  tamsulosin 0.8 milliGRAM(s) Oral at bedtime  ticagrelor 90 milliGRAM(s) Oral every 12 hours    MEDICATIONS  (PRN):  acetaminophen     Tablet .. 650 milliGRAM(s) Oral every 6 hours PRN Temp greater or equal to 38C (100.4F), Mild Pain (1 - 3)  heparin   Injectable 5300 Unit(s) IV Push every 6 hours PRN For aPTT less than 40  melatonin 3 milliGRAM(s) Oral at bedtime PRN Insomnia  ondansetron Injectable 4 milliGRAM(s) IV Push every 8 hours PRN Nausea and/or Vomiting  zolpidem 5 milliGRAM(s) Oral at bedtime PRN Insomnia  zolpidem 5 milliGRAM(s) Oral at bedtime PRN Insomnia        HPI:  81 yo male with hx of Neuropathy, GERD, BPH and renal stones presented with complaints of chest pain. Patient reports that he was walking around this morning and started to have chest pain located near left shoulder and moved to right shoulder. Pain initially worsened but has now improved slightly. States that pain has been waxing and waning. No prior episodes of chest pain. Reports only previous cardiac work up was about 20 years ago without any significant findings. No other complaints. (19 Jan 2023 13:29)    now s/p [facility] with successful PCI and STANLEY to the OM1.    ASSESSMENT/PLAN:    Coronary artery disease  -Admitted overnight for observation.  Pt stable overnight.    -VS, labs, diet, activity as per PCI orders  -IV hydration  -Encourage PO fluids  -Aspirin 81 mg PO daily  -Brilinta 90mg PO twice daily  -Metoprolol tartrate 12.5  mg PO twice daily  -Losartan 25 mg PO daily  -atorvastatin 40mg PO QHS   -Plan of care discussed with patient, family and MD  -likely d/c in AM if patient remains stable overnight  -NP to see in AM to evaluate labs, EKG and procedure site check  -Cardiac rehab info provided/referral and communication to cardiac rehab completed  -Follow-up with attending Dr Ward on 2/3/2023 at 1020  -Discussed therapeutic lifestyle changes to reduce risk factors such as following a cardiac diet, weight loss, maintaining a healthy weight, exercise, smoking cessation, medication compliance, and regular follow-up  with MD to know your numbers (BP, cholesterol, weight, and glucose)

## 2023-01-20 NOTE — DISCHARGE NOTE NURSING/CASE MANAGEMENT/SOCIAL WORK - PATIENT PORTAL LINK FT
You can access the FollowMyHealth Patient Portal offered by [facility] by registering at the following website: [email or web address]. By joining [facility]’s FollowMyHealth portal, you will also be able to view your health information using other applications (apps) compatible with our system.

## 2023-01-20 NOTE — PROGRESS NOTE ADULT - PROBLEM SELECTOR PLAN 1
.  - s/p PCI OM1  - mLAD, and RCA 70%, will need staged PCI in future  - Tele 0 SR with NSVT 3 beats x 2 events after cath   - cont asa, brilinta, statin, BB, arb  - follow up with Dr. Ward in 1-2 weeks   - Right groin benign s/p cath.  No bleeding, no ecchymosis, no hematoma. Extremity Warm to touch, with palpable distal pulses, and brisk capillary refill.   - Thank you for allowing me to participate in care of your patient.   Please call as needed. .  - s/p PCI OM1  - mLAD, and RCA 70%, will need staged PCI in future  - Tele 0 SR with NSVT 3 beats x 2 events after cath   - cont asa, brilinta, statin, BB, arb  - TTE completed, results pending.   - no chest pain, dyspnea, palpitations   - follow up with Dr. Ward in 1-2 weeks   - Right groin benign s/p cath.  No bleeding, no ecchymosis, no hematoma. Extremity Warm to touch, with palpable distal pulses, and brisk capillary refill.   - Thank you for allowing me to participate in care of your patient.   Please call as needed.

## 2023-01-20 NOTE — PROGRESS NOTE ADULT - NS ATTEND AMEND GEN_ALL_CORE FT
Patient was seen and examined at bedside and notes to be doing well post LHC and  s/p PCI OM1 will need staged PCI LAD and prox RCA  Hemodynamically stable with no evidence of hypotension   On DAPT and high intensity statin   Continue with Losartan 25mg po q daily and Toprol XL 25mg po q daily   can be discharged home follow up with Dr. Alvarez in the office in 1 week     Radha Gamboa D.O. [facility]  Cardiology/Vascular Cardiology -[facility] Cardiology   Telephone # [phone]

## 2023-01-20 NOTE — DISCHARGE NOTE NURSING/CASE MANAGEMENT/SOCIAL WORK - NSDCPEFALRISK_GEN_ALL_CORE
For information on Fall & Injury Prevention, visit: [email or web address] OR  [email or web address] OR  https://www.cdc.gov/steadi/patient.html

## 2023-01-21 ENCOUNTER — NON-APPOINTMENT (OUTPATIENT)
Age: 83
End: 2023-01-21

## 2023-01-22 ENCOUNTER — NON-APPOINTMENT (OUTPATIENT)
Age: 83
End: 2023-01-22

## 2023-01-23 ENCOUNTER — NON-APPOINTMENT (OUTPATIENT)
Age: 83
End: 2023-01-23

## 2023-01-23 ENCOUNTER — TRANSCRIPTION ENCOUNTER (OUTPATIENT)
Age: 83
End: 2023-01-23

## 2023-01-23 ENCOUNTER — APPOINTMENT (OUTPATIENT)
Dept: CARDIOLOGY | Facility: CLINIC | Age: 83
End: 2023-01-23
Payer: MEDICARE

## 2023-01-23 VITALS
HEIGHT: 70 IN | DIASTOLIC BLOOD PRESSURE: 60 MMHG | WEIGHT: 190 LBS | BODY MASS INDEX: 27.2 KG/M2 | TEMPERATURE: 97.1 F | HEART RATE: 54 BPM | OXYGEN SATURATION: 97 % | SYSTOLIC BLOOD PRESSURE: 104 MMHG

## 2023-01-23 VITALS — SYSTOLIC BLOOD PRESSURE: 106 MMHG | DIASTOLIC BLOOD PRESSURE: 62 MMHG

## 2023-01-23 DIAGNOSIS — Z87.438 PERSONAL HISTORY OF OTHER DISEASES OF MALE GENITAL ORGANS: ICD-10-CM

## 2023-01-23 DIAGNOSIS — Z78.9 OTHER SPECIFIED HEALTH STATUS: ICD-10-CM

## 2023-01-23 PROBLEM — N20.0 CALCULUS OF KIDNEY: Chronic | Status: ACTIVE | Noted: 2023-01-19

## 2023-01-23 PROBLEM — K29.70 GASTRITIS, UNSPECIFIED, WITHOUT BLEEDING: Chronic | Status: ACTIVE | Noted: 2023-01-19

## 2023-01-23 PROCEDURE — 99215 OFFICE O/P EST HI 40 MIN: CPT

## 2023-01-23 PROCEDURE — 93000 ELECTROCARDIOGRAM COMPLETE: CPT

## 2023-01-23 RX ORDER — TICAGRELOR 90 MG/1
90 TABLET ORAL TWICE DAILY
Refills: 0 | Status: DISCONTINUED | COMMUNITY
End: 2023-01-23

## 2023-01-23 RX ORDER — PREGABALIN 75 MG/1
75 CAPSULE ORAL TWICE DAILY
Refills: 0 | Status: ACTIVE | COMMUNITY

## 2023-01-23 RX ORDER — ZOLPIDEM TARTRATE 10 MG/1
10 TABLET, FILM COATED ORAL
Refills: 0 | Status: ACTIVE | COMMUNITY

## 2023-01-23 RX ORDER — OMEPRAZOLE 20 MG/1
20 CAPSULE, DELAYED RELEASE ORAL DAILY
Refills: 0 | Status: DISCONTINUED | COMMUNITY
End: 2023-01-23

## 2023-01-23 RX ORDER — ASPIRIN 81 MG/1
81 TABLET ORAL DAILY
Qty: 90 | Refills: 3 | Status: ACTIVE | COMMUNITY
Start: 2023-01-23

## 2023-01-23 RX ORDER — FINASTERIDE 5 MG/1
5 TABLET, FILM COATED ORAL DAILY
Refills: 0 | Status: ACTIVE | COMMUNITY

## 2023-01-23 NOTE — REASON FOR VISIT
[Symptom and Test Evaluation] : symptom and test evaluation [CV Risk Factors and Non-Cardiac Disease] : CV risk factors and non-cardiac disease [Coronary Artery Disease] : coronary artery disease [Family Member] : family member

## 2023-01-23 NOTE — DISCUSSION/SUMMARY
[FreeTextEntry1] : \par # NSTEMI with MVCAD s/p PCI OM1, moderate mitral regurg: EF preserved.\par - switch brilinta to plavix, continue aspirin. statin.\par - stage revascularization of prox RCA and iFR guidance of mid LAD at [facility] \par - holding beta blocker and cut losartan in half given hypotension. at 82 years old, his risk of hypotension is extremely high and can lead to fall\par - cardiac rehab after complete revascularization\par \par # Gastritis:\par - switch to protonix (omeprazole can decrease efficacy of plavix).\par \par \par Follow after pci.  ER precautions given to patient.\par \par

## 2023-01-23 NOTE — HISTORY OF PRESENT ILLNESS
[FreeTextEntry1] : \par 83 yo M\par NSTEMI with MVCAD s/p PCI OM1. 1/2023. \par Gastritis\par \par 1/2023 INITIAL VISIT: here after nstemi from [facility]. rcfa and radial access sites well healing. does report atypical dyspnea consistent with brilinta side effect. no angina. ekg today without issue. does report fatigue and hypotension when on bb/arb. he was not on cv medications prior.\par \par \par TESTING I REVIEWED TODAY:

## 2023-01-24 ENCOUNTER — TRANSCRIPTION ENCOUNTER (OUTPATIENT)
Age: 83
End: 2023-01-24

## 2023-01-26 ENCOUNTER — TRANSCRIPTION ENCOUNTER (OUTPATIENT)
Age: 83
End: 2023-01-26

## 2023-01-27 ENCOUNTER — NON-APPOINTMENT (OUTPATIENT)
Age: 83
End: 2023-01-27

## 2023-01-28 ENCOUNTER — TRANSCRIPTION ENCOUNTER (OUTPATIENT)
Age: 83
End: 2023-01-28

## 2023-01-31 ENCOUNTER — TRANSCRIPTION ENCOUNTER (OUTPATIENT)
Age: 83
End: 2023-01-31

## 2023-02-03 ENCOUNTER — APPOINTMENT (OUTPATIENT)
Dept: CARDIOLOGY | Facility: CLINIC | Age: 83
End: 2023-02-03

## 2023-02-03 ENCOUNTER — NON-APPOINTMENT (OUTPATIENT)
Age: 83
End: 2023-02-03

## 2023-02-07 ENCOUNTER — NON-APPOINTMENT (OUTPATIENT)
Age: 83
End: 2023-02-07

## 2023-02-07 LAB
ANION GAP SERPL CALC-SCNC: 12 MMOL/L
BASOPHILS # BLD AUTO: 0.04 K/UL
BASOPHILS NFR BLD AUTO: 0.5 %
BUN SERPL-MCNC: 20 MG/DL
CALCIUM SERPL-MCNC: 9.3 MG/DL
CHLORIDE SERPL-SCNC: 103 MMOL/L
CO2 SERPL-SCNC: 26 MMOL/L
CREAT SERPL-MCNC: 1.32 MG/DL
EGFR: 55 ML/MIN/1.73M2
EOSINOPHIL # BLD AUTO: 0.41 K/UL
EOSINOPHIL NFR BLD AUTO: 5 %
GLUCOSE SERPL-MCNC: 101 MG/DL
HCT VFR BLD CALC: 44.9 %
HGB BLD-MCNC: 14.4 G/DL
IMM GRANULOCYTES NFR BLD AUTO: 0.2 %
LYMPHOCYTES # BLD AUTO: 1.39 K/UL
LYMPHOCYTES NFR BLD AUTO: 17 %
MAN DIFF?: NORMAL
MCHC RBC-ENTMCNC: 30 PG
MCHC RBC-ENTMCNC: 32.1 GM/DL
MCV RBC AUTO: 93.5 FL
MONOCYTES # BLD AUTO: 0.67 K/UL
MONOCYTES NFR BLD AUTO: 8.2 %
NEUTROPHILS # BLD AUTO: 5.64 K/UL
NEUTROPHILS NFR BLD AUTO: 69.1 %
PLATELET # BLD AUTO: 358 K/UL
POTASSIUM SERPL-SCNC: 4.7 MMOL/L
RBC # BLD: 4.8 M/UL
RBC # FLD: 13.1 %
SODIUM SERPL-SCNC: 140 MMOL/L
WBC # FLD AUTO: 8.17 K/UL

## 2023-02-08 ENCOUNTER — NON-APPOINTMENT (OUTPATIENT)
Age: 83
End: 2023-02-08

## 2023-02-08 LAB — SARS-COV-2 N GENE NPH QL NAA+PROBE: NOT DETECTED

## 2023-02-08 RX ORDER — CHLORHEXIDINE GLUCONATE 213 G/1000ML
1 SOLUTION TOPICAL ONCE
Refills: 0 | Status: COMPLETED | OUTPATIENT
Start: 2023-02-09 | End: 2023-02-09

## 2023-02-08 NOTE — H&P PST ADULT - ASSESSMENT
Staged PCI of 70% pRCA and IFR guidance of  70% mLAD    -NPO for procedure  -Consent obtained by MD AGUILERA hydration pre/post procedure as protocol to prevent JESSIE  -DAPT preprocedure

## 2023-02-08 NOTE — H&P PST ADULT - NSICDXPASTMEDICALHX_GEN_ALL_CORE_FT
PAST MEDICAL HISTORY:  CAD (coronary artery disease)     Gastritis     Hyperlipidemia     Kidney stones     Mitral regurgitation     NSTEMI (non-ST elevation myocardial infarction)

## 2023-02-08 NOTE — H&P PST ADULT - NSICDXPASTSURGICALHX_GEN_ALL_CORE_FT
PAST SURGICAL HISTORY:  S/P coronary artery stent placement     S/P total knee replacement, left     Total knee replacement status, right

## 2023-02-08 NOTE — H&P PST ADULT - HISTORY OF PRESENT ILLNESS
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      ASA  Mallampati  GFR  creat  BRA    Symptoms:        Angina (Class):        Ischemic Symptoms:     Heart Failure:        Systolic/Diastolic/Combined:        NYHA Class (within 2 weeks):     Assessment of LVEF (Must be within 6 months):       EF: 55-60%       Assessed by: TTE       Date: 1/19/23    Prior Cardiac Interventions (LHC, stents, CABG): STANLEY x1 to 90%OM1 (Xience 2.68k92oc) 1/19/23       PCI's (Date, Stents, Vessels):   < from: Cardiac Catheterization (01.19.23 @ 15:29) >  Diagnostic Findings:     Coronary Angiography   The coronary circulation is right dominant.      LM   Left main artery: Angiography showsmild atherosclerosis.      LAD   Mid left anterior descending: There is a 70 % stenosis. Second  diagonal: There is a 50 % stenosis.    CX   First obtuse marginal: There is a 90 % stenosis. This is the culprit  lesion.    RCA   Proximal right coronary artery: There is a 70 % stenosis.      < end of copied text >  < from: Cardiac Catheterization (01.19.23 @ 15:29) >  ****OF NOTE, radial engagement difficult due to subclavian/aortic  tortuosity.****    Recommendations:     1. needs beta blocker. DAPT, statin, BP control. Monitor overnight.  Formal TTE. IV saline.    2. Stage PCI of LAD and RCA for complete revascularization.      < end of copied text >         CABG (Date, Grafts): NONE    Noninvasive Testing:   Echo (Date, Findings):   < from: TTE Echo Complete w/ Contrast w/ Doppler (01.19.23 @ 18:10) >  Summary:   1. Left ventricular ejection fraction, by visual estimation, is 55 to   60%.   2. Normal global left ventricular systolic function.   3. LV Ejection Fraction by Khalil's Method with a biplane EF of 58 %.   4. There is moderate asymmetric left ventricular hypertrophy.   5. Mildly enlarged right ventricle.   6. Mild mitral annular calcification.   7. Mild thickening of the anterior and posterior mitral valve leaflets.   8. Moderate mitral valve regurgitation.   9. Mild to moderate pulmonic valve regurgitation.  10. There is no evidence of pericardial effusion.  11. There are no prior studies on this patient for comparison purposes.    Radha Gamboa MD Electronically signed on 1/20/2023 at 10:13:11 AM    < end of copied text >    Antianginal Therapies:        Beta Blockers:  none secondary to hypotension       Calcium Channel Blockers:        Long Acting Nitrates:        Ranexa:     Associated Risk Factors:        Cerebrovascular Disease: N/A       Chronic Lung Disease: N/A       Peripheral Arterial Disease: N/A       Chronic Kidney Disease (if yes, what is GFR): N/A       Uncontrolled Diabetes (if yes, what is HgbA1C or FBS): N/A       Poorly Controlled Hypertension (if yes, what is SBP): N/A       Morbid Obesity (if yes, what is BMI): N/A       History of Recent Ventricular Arrhythmia: N/A       Inability to Ambulate Safely: N/A       Need for Therapeutic Anticoagulation: N/A       Antiplatelet or Contrast Allergy: N/A This is an 83 y/o male with h/o NSTEMI, 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      ASA 2  Mallampati 2  GFR 55  creat 1.32  BRA 1.5%    Symptoms:        Angina (Class): 2-3       Ischemic Symptoms: chest pain    Heart Failure: NONE         Assessment of LVEF (Must be within 6 months):       EF: 55-60%       Assessed by: TTE       Date: 1/19/23    Prior Cardiac Interventions (LHC, stents, CABG): STANLEY x1 to 90%OM1 (Xience 2.32q14yv) 1/19/23       PCI's (Date, Stents, Vessels):   < from: Cardiac Catheterization (01.19.23 @ 15:29) >  Diagnostic Findings:     Coronary Angiography   The coronary circulation is right dominant.      LM   Left main artery: Angiography showsmild atherosclerosis.      LAD   Mid left anterior descending: There is a 70 % stenosis. Second  diagonal: There is a 50 % stenosis.    CX   First obtuse marginal: There is a 90 % stenosis. This is the culprit  lesion.    RCA   Proximal right coronary artery: There is a 70 % stenosis.      < end of copied text >  < from: Cardiac Catheterization (01.19.23 @ 15:29) >  ****OF NOTE, radial engagement difficult due to subclavian/aortic  tortuosity.****    Recommendations:     1. needs beta blocker. DAPT, statin, BP control. Monitor overnight.  Formal TTE. IV saline.    2. Stage PCI of LAD and RCA for complete revascularization.      < end of copied text >         CABG (Date, Grafts): NONE    Noninvasive Testing:   Echo (Date, Findings):   < from: TTE Echo Complete w/ Contrast w/ Doppler (01.19.23 @ 18:10) >  Summary:   1. Left ventricular ejection fraction, by visual estimation, is 55 to   60%.   2. Normal global left ventricular systolic function.   3. LV Ejection Fraction by Khalil's Method with a biplane EF of 58 %.   4. There is moderate asymmetric left ventricular hypertrophy.   5. Mildly enlarged right ventricle.   6. Mild mitral annular calcification.   7. Mild thickening of the anterior and posterior mitral valve leaflets.   8. Moderate mitral valve regurgitation.   9. Mild to moderate pulmonic valve regurgitation.  10. There is no evidence of pericardial effusion.  11. There are no prior studies on this patient for comparison purposes.    Radha Gamboa MD Electronically signed on 1/20/2023 at 10:13:11 AM    < end of copied text >    Antianginal Therapies:        Beta Blockers:  metoprolol       Associated Risk Factors:        Cerebrovascular Disease: N/A       Chronic Lung Disease: N/A       Peripheral Arterial Disease: N/A       Chronic Kidney Disease (if yes, what is GFR): N/A       Uncontrolled Diabetes (if yes, what is HgbA1C or FBS): N/A       Poorly Controlled Hypertension (if yes, what is SBP): N/A       Morbid Obesity (if yes, what is BMI): N/A       History of Recent Ventricular Arrhythmia: N/A       Inability to Ambulate Safely: N/A       Need for Therapeutic Anticoagulation: N/A       Antiplatelet or Contrast Allergy: N/A

## 2023-02-09 ENCOUNTER — TRANSCRIPTION ENCOUNTER (OUTPATIENT)
Age: 83
End: 2023-02-09

## 2023-02-09 ENCOUNTER — INPATIENT (INPATIENT)
Facility: HOSPITAL | Age: 83
LOS: 0 days | Discharge: HOME | DRG: 247 | End: 2023-02-10
Attending: INTERNAL MEDICINE | Admitting: INTERNAL MEDICINE
Payer: COMMERCIAL

## 2023-02-09 VITALS
TEMPERATURE: 98 F | HEIGHT: 69 IN | DIASTOLIC BLOOD PRESSURE: 80 MMHG | OXYGEN SATURATION: 99 % | RESPIRATION RATE: 17 BRPM | WEIGHT: 184.97 LBS | HEART RATE: 66 BPM | SYSTOLIC BLOOD PRESSURE: 166 MMHG

## 2023-02-09 DIAGNOSIS — Z95.5 PRESENCE OF CORONARY ANGIOPLASTY IMPLANT AND GRAFT: Chronic | ICD-10-CM

## 2023-02-09 DIAGNOSIS — Z96.652 PRESENCE OF LEFT ARTIFICIAL KNEE JOINT: Chronic | ICD-10-CM

## 2023-02-09 DIAGNOSIS — I25.10 ATHEROSCLEROTIC HEART DISEASE OF NATIVE CORONARY ARTERY WITHOUT ANGINA PECTORIS: ICD-10-CM

## 2023-02-09 DIAGNOSIS — Z96.651 PRESENCE OF RIGHT ARTIFICIAL KNEE JOINT: Chronic | ICD-10-CM

## 2023-02-09 LAB
ANION GAP SERPL CALC-SCNC: 8 MMOL/L — SIGNIFICANT CHANGE UP (ref 5–17)
BASOPHILS # BLD AUTO: 0.05 K/UL — SIGNIFICANT CHANGE UP (ref 0–0.2)
BASOPHILS NFR BLD AUTO: 0.7 % — SIGNIFICANT CHANGE UP (ref 0–2)
BLD GP AB SCN SERPL QL: SIGNIFICANT CHANGE UP
BUN SERPL-MCNC: 21.2 MG/DL — HIGH (ref 8–20)
CALCIUM SERPL-MCNC: 8.9 MG/DL — SIGNIFICANT CHANGE UP (ref 8.4–10.5)
CHLORIDE SERPL-SCNC: 102 MMOL/L — SIGNIFICANT CHANGE UP (ref 96–108)
CHOLEST SERPL-MCNC: 93 MG/DL — SIGNIFICANT CHANGE UP
CO2 SERPL-SCNC: 28 MMOL/L — SIGNIFICANT CHANGE UP (ref 22–29)
CREAT SERPL-MCNC: 1.33 MG/DL — HIGH (ref 0.5–1.3)
EGFR: 54 ML/MIN/1.73M2 — LOW
EOSINOPHIL # BLD AUTO: 0.36 K/UL — SIGNIFICANT CHANGE UP (ref 0–0.5)
EOSINOPHIL NFR BLD AUTO: 4.7 % — SIGNIFICANT CHANGE UP (ref 0–6)
GLUCOSE SERPL-MCNC: 112 MG/DL — HIGH (ref 70–99)
HCT VFR BLD CALC: 45.1 % — SIGNIFICANT CHANGE UP (ref 39–50)
HDLC SERPL-MCNC: 32 MG/DL — LOW
HGB BLD-MCNC: 14.7 G/DL — SIGNIFICANT CHANGE UP (ref 13–17)
IMM GRANULOCYTES NFR BLD AUTO: 0.5 % — SIGNIFICANT CHANGE UP (ref 0–0.9)
LIPID PNL WITH DIRECT LDL SERPL: 46 MG/DL — SIGNIFICANT CHANGE UP
LYMPHOCYTES # BLD AUTO: 1.1 K/UL — SIGNIFICANT CHANGE UP (ref 1–3.3)
LYMPHOCYTES # BLD AUTO: 14.4 % — SIGNIFICANT CHANGE UP (ref 13–44)
MCHC RBC-ENTMCNC: 29.7 PG — SIGNIFICANT CHANGE UP (ref 27–34)
MCHC RBC-ENTMCNC: 32.6 GM/DL — SIGNIFICANT CHANGE UP (ref 32–36)
MCV RBC AUTO: 91.1 FL — SIGNIFICANT CHANGE UP (ref 80–100)
MONOCYTES # BLD AUTO: 0.54 K/UL — SIGNIFICANT CHANGE UP (ref 0–0.9)
MONOCYTES NFR BLD AUTO: 7 % — SIGNIFICANT CHANGE UP (ref 2–14)
NEUTROPHILS # BLD AUTO: 5.57 K/UL — SIGNIFICANT CHANGE UP (ref 1.8–7.4)
NEUTROPHILS NFR BLD AUTO: 72.7 % — SIGNIFICANT CHANGE UP (ref 43–77)
NON HDL CHOLESTEROL: 61 MG/DL — SIGNIFICANT CHANGE UP
PLATELET # BLD AUTO: 337 K/UL — SIGNIFICANT CHANGE UP (ref 150–400)
POTASSIUM SERPL-MCNC: 4.1 MMOL/L — SIGNIFICANT CHANGE UP (ref 3.5–5.3)
POTASSIUM SERPL-SCNC: 4.1 MMOL/L — SIGNIFICANT CHANGE UP (ref 3.5–5.3)
RBC # BLD: 4.95 M/UL — SIGNIFICANT CHANGE UP (ref 4.2–5.8)
RBC # FLD: 13.3 % — SIGNIFICANT CHANGE UP (ref 10.3–14.5)
SODIUM SERPL-SCNC: 138 MMOL/L — SIGNIFICANT CHANGE UP (ref 135–145)
TRIGL SERPL-MCNC: 73 MG/DL — SIGNIFICANT CHANGE UP
WBC # BLD: 7.66 K/UL — SIGNIFICANT CHANGE UP (ref 3.8–10.5)
WBC # FLD AUTO: 7.66 K/UL — SIGNIFICANT CHANGE UP (ref 3.8–10.5)

## 2023-02-09 PROCEDURE — 99152 MOD SED SAME PHYS/QHP 5/>YRS: CPT

## 2023-02-09 PROCEDURE — 93010 ELECTROCARDIOGRAM REPORT: CPT | Mod: 76

## 2023-02-09 PROCEDURE — 93571 IV DOP VEL&/PRESS C FLO 1ST: CPT | Mod: 26,52,LD

## 2023-02-09 PROCEDURE — 92928 PRQ TCAT PLMT NTRAC ST 1 LES: CPT | Mod: RC

## 2023-02-09 PROCEDURE — 92978 ENDOLUMINL IVUS OCT C 1ST: CPT | Mod: 26,RC

## 2023-02-09 RX ORDER — FINASTERIDE 5 MG/1
5 TABLET, FILM COATED ORAL DAILY
Refills: 0 | Status: DISCONTINUED | OUTPATIENT
Start: 2023-02-09 | End: 2023-02-10

## 2023-02-09 RX ORDER — SODIUM CHLORIDE 9 MG/ML
250 INJECTION INTRAMUSCULAR; INTRAVENOUS; SUBCUTANEOUS
Refills: 0 | Status: DISCONTINUED | OUTPATIENT
Start: 2023-02-09 | End: 2023-02-10

## 2023-02-09 RX ORDER — SODIUM CHLORIDE 9 MG/ML
600 INJECTION INTRAMUSCULAR; INTRAVENOUS; SUBCUTANEOUS
Refills: 0 | Status: DISCONTINUED | OUTPATIENT
Start: 2023-02-09 | End: 2023-02-10

## 2023-02-09 RX ORDER — ZOLPIDEM TARTRATE 10 MG/1
5 TABLET ORAL AT BEDTIME
Refills: 0 | Status: DISCONTINUED | OUTPATIENT
Start: 2023-02-09 | End: 2023-02-10

## 2023-02-09 RX ORDER — PANTOPRAZOLE SODIUM 20 MG/1
40 TABLET, DELAYED RELEASE ORAL
Refills: 0 | Status: DISCONTINUED | OUTPATIENT
Start: 2023-02-09 | End: 2023-02-10

## 2023-02-09 RX ORDER — TAMSULOSIN HYDROCHLORIDE 0.4 MG/1
0.8 CAPSULE ORAL AT BEDTIME
Refills: 0 | Status: DISCONTINUED | OUTPATIENT
Start: 2023-02-09 | End: 2023-02-10

## 2023-02-09 RX ORDER — ONDANSETRON 8 MG/1
4 TABLET, FILM COATED ORAL ONCE
Refills: 0 | Status: COMPLETED | OUTPATIENT
Start: 2023-02-09 | End: 2023-02-09

## 2023-02-09 RX ORDER — OMEPRAZOLE 10 MG/1
1 CAPSULE, DELAYED RELEASE ORAL
Qty: 0 | Refills: 0 | DISCHARGE

## 2023-02-09 RX ORDER — ATORVASTATIN CALCIUM 80 MG/1
40 TABLET, FILM COATED ORAL AT BEDTIME
Refills: 0 | Status: DISCONTINUED | OUTPATIENT
Start: 2023-02-09 | End: 2023-02-10

## 2023-02-09 RX ORDER — CLOPIDOGREL BISULFATE 75 MG/1
75 TABLET, FILM COATED ORAL DAILY
Refills: 0 | Status: DISCONTINUED | OUTPATIENT
Start: 2023-02-09 | End: 2023-02-10

## 2023-02-09 RX ORDER — ASPIRIN/CALCIUM CARB/MAGNESIUM 324 MG
81 TABLET ORAL DAILY
Refills: 0 | Status: DISCONTINUED | OUTPATIENT
Start: 2023-02-09 | End: 2023-02-10

## 2023-02-09 RX ORDER — LOSARTAN POTASSIUM 100 MG/1
25 TABLET, FILM COATED ORAL DAILY
Refills: 0 | Status: DISCONTINUED | OUTPATIENT
Start: 2023-02-09 | End: 2023-02-10

## 2023-02-09 RX ADMIN — ATORVASTATIN CALCIUM 40 MILLIGRAM(S): 80 TABLET, FILM COATED ORAL at 21:06

## 2023-02-09 RX ADMIN — CHLORHEXIDINE GLUCONATE 1 APPLICATION(S): 213 SOLUTION TOPICAL at 11:29

## 2023-02-09 RX ADMIN — ZOLPIDEM TARTRATE 5 MILLIGRAM(S): 10 TABLET ORAL at 22:36

## 2023-02-09 RX ADMIN — SODIUM CHLORIDE 250 MILLILITER(S): 9 INJECTION INTRAMUSCULAR; INTRAVENOUS; SUBCUTANEOUS at 08:43

## 2023-02-09 RX ADMIN — SODIUM CHLORIDE 150 MILLILITER(S): 9 INJECTION INTRAMUSCULAR; INTRAVENOUS; SUBCUTANEOUS at 12:00

## 2023-02-09 RX ADMIN — TAMSULOSIN HYDROCHLORIDE 0.8 MILLIGRAM(S): 0.4 CAPSULE ORAL at 21:06

## 2023-02-09 RX ADMIN — Medication 75 MILLIGRAM(S): at 21:05

## 2023-02-09 RX ADMIN — ONDANSETRON 4 MILLIGRAM(S): 8 TABLET, FILM COATED ORAL at 16:20

## 2023-02-09 NOTE — DISCHARGE NOTE PROVIDER - HOSPITAL COURSE
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  Now s/p LHC/staged PCI via LFA with Dr. Ward: STANLEY x 1 to pRCA (XIENCE SKYPOINT 3.5x38mm) and STANLEY x 1 to mLAD (IFR 0.87) (XIENCE SKYPOINT 2.88I97pw) (prelim report; official report to follow).  -Admit to tele secondary to meeting major admission requirements of complex anatomy (2 vessel system PCI) and CKD (GFR 55)  -If stable, hopeful discharge to home in the am  -Bedrest with HOB < 30 degrees x 3 hours post procedure until 1400 then sit up and ambulate as tolerated  -IVF hydration post procedure to prevent JESSIE; NS at 150cc/hr x 4 hours as per Dr. Ward  -Check kidney function in am  -Meds: Maintain DAPT with ASA/Plavix, statin as before  -No beta blocker secondary to hypotension when previously taken; maintain current low dose ARB  -Benefits of ASA/plavix emphasized with patient verbal understanding  -Lifestyle mods/diet/activity/meds discussed with patient verbal understanding  -cardiac rehab info provided/referral and communication to cardiac rehab completed  -Follow up with Dr. Ward in one to two weeks

## 2023-02-09 NOTE — DISCHARGE NOTE PROVIDER - NSDCCPTREATMENT_GEN_ALL_CORE_FT
PRINCIPAL PROCEDURE  Procedure: Coronary stent placement  Findings and Treatment: Maintain DAPT and statin

## 2023-02-09 NOTE — DISCHARGE NOTE PROVIDER - NSDCFUSCHEDAPPT_GEN_ALL_CORE_FT
Khanh Ward  [facility] Physician [facility]  CARDIOLOGY [address]  Scheduled Appointment: 02/15/2023

## 2023-02-09 NOTE — PROGRESS NOTE ADULT - ASSESSMENT
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  Now s/p LHC/staged PCI via LFA with Dr. Ward: STANLEY x 1 to pRCA (XIENCE SKYPOINT 3.5x38mm) and STANLEY x 1 to mLAD (IFR 0.87) (XIENCE SKYPOINT 2.80Z65ee) (prelim report; official report to follow).  -Admit to tele secondary to meeting major admission requirements of complex anatomy (2 vessel system PCI) and CKD (GFR 55)  -If stable, hopeful discharge to home in the am  -Bedrest with HOB < 30 degrees x 3 hours post procedure until 1400 then sit up and ambulate as tolerated  -Meds: Maintain DAPT with ASA/Plavix, statin as before  -No beta blocker secondary to hypotension when previously taken; maintain current low dose ARB  -Benefits of ASA/plavix emphasized with patient verbal understanding  -Lifestyle mods/diet/activity/meds discussed with patient verbal understanding  -cardiac rehab info provided/referral and communication to cardiac rehab completed  -Follow up with Dr. 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  Now s/p LHC/staged PCI via LFA with Dr. Ward: STANLEY x 1 to pRCA (XIENCE SKYPOINT 3.5x38mm) and STANLEY x 1 to mLAD (IFR 0.87) (XIENCE SKYPOINT 2.31W99iw) (prelim report; official report to follow).  -Admit to tele secondary to meeting major admission requirements of complex anatomy (2 vessel system PCI) and CKD (GFR 55)  -If stable, hopeful discharge to home in the am  -Bedrest with HOB < 30 degrees x 3 hours post procedure until 1400 then sit up and ambulate as tolerated  -IVF hydration post procedure to prevent JESSIE; NS at 150cc/hr x 4 hours as per Dr. Ward  -Meds: Maintain DAPT with ASA/Plavix, statin as before  -No beta blocker secondary to hypotension when previously taken; maintain current low dose ARB  -Benefits of ASA/plavix emphasized with patient verbal understanding  -Lifestyle mods/diet/activity/meds discussed with patient verbal understanding  -cardiac rehab info provided/referral and communication to cardiac rehab completed  -Follow up with Dr. 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  Now s/p LHC/staged PCI via LFA with Dr. Ward: STANLEY x 1 to pRCA (XIENCE SKYPOINT 3.5x38mm) and STANLEY x 1 to mLAD (IFR 0.87) (XIENCE SKYPOINT 2.27S51cg) (prelim report; official report to follow).  -Admit to tele secondary to meeting major admission requirements of complex anatomy (2 vessel system PCI) and CKD (GFR 55)  -If stable, hopeful discharge to home in the am  -Bedrest with HOB < 30 degrees x 3 hours post procedure until 1400 then sit up and ambulate as tolerated  -IVF hydration post procedure to prevent JESSIE; NS at 150cc/hr x 4 hours as per Dr. Ward  -Meds: Maintain DAPT with ASA/Plavix, statin as before  -No beta blocker secondary to hypotension when previously taken; maintain current low dose ARB  -Benefits of ASA/plavix emphasized with patient verbal understanding  -Lifestyle mods/diet/activity/meds discussed with patient verbal understanding  -cardiac rehab info provided/referral and communication to cardiac rehab completed  -Follow up with Dr. Ward in one to two weeks    CARDIOLOGY NP ADDENDUM:  LFA site with some constant oozing; no hematoma; nontender  Fem stop device applied to LFA site at 100 mmhg x 1 hour then titrate to off; hemostasis obtained; LLE warm/mobile/acyanotic with + 1 Left PP while in place  VS and neurovascular check to LLE every 15 minutes while fem stop device in place  Once fem stop removed, bedrest with HOB < 30 degrees x 1 hour then OOb if remains stable

## 2023-02-09 NOTE — DISCHARGE NOTE PROVIDER - CARE PROVIDER_API CALL
Khanh Ward)  [facility]  [address] [place] [address]  Phone: [phone]  Fax: [phone]  Established Patient  Scheduled Appointment: 02/15/2023

## 2023-02-09 NOTE — DISCHARGE NOTE PROVIDER - NSDCMRMEDTOKEN_GEN_ALL_CORE_FT
alfuzosin 10 mg oral tablet, extended release: 1 tab(s) orally once a day  aspirin 81 mg oral tablet, chewable: 1 tab(s) orally once a day  atorvastatin 40 mg oral tablet: 1 tab(s) orally once a day (at bedtime)  clopidogrel 75 mg oral tablet: 1 tab(s) orally once a day  finasteride 5 mg oral tablet: 1 tab(s) orally once a day  losartan 25 mg oral tablet: 0.5 tab(s) orally once a day  pantoprazole 40 mg oral delayed release tablet: 1 tab(s) orally once a day  pregabalin 75 mg oral capsule: 1 cap(s) orally 2 times a day  zolpidem 10 mg oral tablet: 1 tab(s) orally once a day (at bedtime)

## 2023-02-10 ENCOUNTER — TRANSCRIPTION ENCOUNTER (OUTPATIENT)
Age: 83
End: 2023-02-10

## 2023-02-10 VITALS — RESPIRATION RATE: 16 BRPM | SYSTOLIC BLOOD PRESSURE: 116 MMHG | DIASTOLIC BLOOD PRESSURE: 58 MMHG | HEART RATE: 75 BPM

## 2023-02-10 LAB
ALBUMIN SERPL ELPH-MCNC: 3.4 G/DL — SIGNIFICANT CHANGE UP (ref 3.3–5.2)
ALP SERPL-CCNC: 103 U/L — SIGNIFICANT CHANGE UP (ref 40–120)
ALT FLD-CCNC: 16 U/L — SIGNIFICANT CHANGE UP
ANION GAP SERPL CALC-SCNC: 5 MMOL/L — SIGNIFICANT CHANGE UP (ref 5–17)
AST SERPL-CCNC: 21 U/L — SIGNIFICANT CHANGE UP
BASOPHILS # BLD AUTO: 0.03 K/UL — SIGNIFICANT CHANGE UP (ref 0–0.2)
BASOPHILS NFR BLD AUTO: 0.3 % — SIGNIFICANT CHANGE UP (ref 0–2)
BILIRUB DIRECT SERPL-MCNC: 0.1 MG/DL — SIGNIFICANT CHANGE UP (ref 0–0.3)
BILIRUB INDIRECT FLD-MCNC: 0.4 MG/DL — SIGNIFICANT CHANGE UP (ref 0.2–1)
BILIRUB SERPL-MCNC: 0.5 MG/DL — SIGNIFICANT CHANGE UP (ref 0.4–2)
BUN SERPL-MCNC: 22 MG/DL — HIGH (ref 8–20)
CALCIUM SERPL-MCNC: 8.5 MG/DL — SIGNIFICANT CHANGE UP (ref 8.4–10.5)
CHLORIDE SERPL-SCNC: 104 MMOL/L — SIGNIFICANT CHANGE UP (ref 96–108)
CO2 SERPL-SCNC: 28 MMOL/L — SIGNIFICANT CHANGE UP (ref 22–29)
CREAT SERPL-MCNC: 1.19 MG/DL — SIGNIFICANT CHANGE UP (ref 0.5–1.3)
EGFR: 62 ML/MIN/1.73M2 — SIGNIFICANT CHANGE UP
EOSINOPHIL # BLD AUTO: 0.42 K/UL — SIGNIFICANT CHANGE UP (ref 0–0.5)
EOSINOPHIL NFR BLD AUTO: 4.9 % — SIGNIFICANT CHANGE UP (ref 0–6)
GLUCOSE SERPL-MCNC: 92 MG/DL — SIGNIFICANT CHANGE UP (ref 70–99)
HCT VFR BLD CALC: 39.4 % — SIGNIFICANT CHANGE UP (ref 39–50)
HGB BLD-MCNC: 12.7 G/DL — LOW (ref 13–17)
IMM GRANULOCYTES NFR BLD AUTO: 0.3 % — SIGNIFICANT CHANGE UP (ref 0–0.9)
LYMPHOCYTES # BLD AUTO: 1.09 K/UL — SIGNIFICANT CHANGE UP (ref 1–3.3)
LYMPHOCYTES # BLD AUTO: 12.7 % — LOW (ref 13–44)
MCHC RBC-ENTMCNC: 29.2 PG — SIGNIFICANT CHANGE UP (ref 27–34)
MCHC RBC-ENTMCNC: 32.2 GM/DL — SIGNIFICANT CHANGE UP (ref 32–36)
MCV RBC AUTO: 90.6 FL — SIGNIFICANT CHANGE UP (ref 80–100)
MONOCYTES # BLD AUTO: 0.72 K/UL — SIGNIFICANT CHANGE UP (ref 0–0.9)
MONOCYTES NFR BLD AUTO: 8.4 % — SIGNIFICANT CHANGE UP (ref 2–14)
NEUTROPHILS # BLD AUTO: 6.29 K/UL — SIGNIFICANT CHANGE UP (ref 1.8–7.4)
NEUTROPHILS NFR BLD AUTO: 73.4 % — SIGNIFICANT CHANGE UP (ref 43–77)
PLATELET # BLD AUTO: 293 K/UL — SIGNIFICANT CHANGE UP (ref 150–400)
POTASSIUM SERPL-MCNC: 4.5 MMOL/L — SIGNIFICANT CHANGE UP (ref 3.5–5.3)
POTASSIUM SERPL-SCNC: 4.5 MMOL/L — SIGNIFICANT CHANGE UP (ref 3.5–5.3)
PROT SERPL-MCNC: 6 G/DL — LOW (ref 6.6–8.7)
RBC # BLD: 4.35 M/UL — SIGNIFICANT CHANGE UP (ref 4.2–5.8)
RBC # FLD: 13.2 % — SIGNIFICANT CHANGE UP (ref 10.3–14.5)
SODIUM SERPL-SCNC: 137 MMOL/L — SIGNIFICANT CHANGE UP (ref 135–145)
WBC # BLD: 8.58 K/UL — SIGNIFICANT CHANGE UP (ref 3.8–10.5)
WBC # FLD AUTO: 8.58 K/UL — SIGNIFICANT CHANGE UP (ref 3.8–10.5)

## 2023-02-10 PROCEDURE — 85025 COMPLETE CBC W/AUTO DIFF WBC: CPT

## 2023-02-10 PROCEDURE — 86901 BLOOD TYPING SEROLOGIC RH(D): CPT

## 2023-02-10 PROCEDURE — C1753: CPT

## 2023-02-10 PROCEDURE — 36415 COLL VENOUS BLD VENIPUNCTURE: CPT

## 2023-02-10 PROCEDURE — 93458 L HRT ARTERY/VENTRICLE ANGIO: CPT

## 2023-02-10 PROCEDURE — 86900 BLOOD TYPING SEROLOGIC ABO: CPT

## 2023-02-10 PROCEDURE — C1725: CPT

## 2023-02-10 PROCEDURE — 80061 LIPID PANEL: CPT

## 2023-02-10 PROCEDURE — 92978 ENDOLUMINL IVUS OCT C 1ST: CPT | Mod: RC

## 2023-02-10 PROCEDURE — 80076 HEPATIC FUNCTION PANEL: CPT

## 2023-02-10 PROCEDURE — C1874: CPT

## 2023-02-10 PROCEDURE — C1887: CPT

## 2023-02-10 PROCEDURE — C9600: CPT | Mod: LD

## 2023-02-10 PROCEDURE — 80048 BASIC METABOLIC PNL TOTAL CA: CPT

## 2023-02-10 PROCEDURE — 93010 ELECTROCARDIOGRAM REPORT: CPT

## 2023-02-10 PROCEDURE — C1760: CPT

## 2023-02-10 PROCEDURE — 93005 ELECTROCARDIOGRAM TRACING: CPT

## 2023-02-10 PROCEDURE — C1894: CPT

## 2023-02-10 PROCEDURE — 93571 IV DOP VEL&/PRESS C FLO 1ST: CPT

## 2023-02-10 PROCEDURE — 86850 RBC ANTIBODY SCREEN: CPT

## 2023-02-10 PROCEDURE — C1769: CPT

## 2023-02-10 RX ORDER — CLOPIDOGREL BISULFATE 75 MG/1
1 TABLET, FILM COATED ORAL
Qty: 90 | Refills: 3
Start: 2023-02-10

## 2023-02-10 RX ORDER — CLOPIDOGREL BISULFATE 75 MG/1
1 TABLET, FILM COATED ORAL
Qty: 0 | Refills: 0 | DISCHARGE

## 2023-02-10 RX ADMIN — LOSARTAN POTASSIUM 25 MILLIGRAM(S): 100 TABLET, FILM COATED ORAL at 06:03

## 2023-02-10 RX ADMIN — PANTOPRAZOLE SODIUM 40 MILLIGRAM(S): 20 TABLET, DELAYED RELEASE ORAL at 06:03

## 2023-02-10 RX ADMIN — Medication 75 MILLIGRAM(S): at 06:03

## 2023-02-10 NOTE — DISCHARGE NOTE NURSING/CASE MANAGEMENT/SOCIAL WORK - PATIENT PORTAL LINK FT
You can access the FollowMyHealth Patient Portal offered by [facility] by registering at the following website: [email or web address]. By joining [employer]’s FollowMyHealth portal, you will also be able to view your health information using other applications (apps) compatible with our system.

## 2023-02-10 NOTE — DISCHARGE NOTE NURSING/CASE MANAGEMENT/SOCIAL WORK - NSDCPEFALRISK_GEN_ALL_CORE
For information on Fall & Injury Prevention, visit: [email or web address] OR  [email or web address] OR  https://www.cdc.gov/steadi/patient.html

## 2023-02-10 NOTE — PROGRESS NOTE ADULT - ASSESSMENT
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  Now s/p LHC/staged PCI via LFA with Dr. Ward: STANLEY x 1 to pRCA (XIENCE SKYPOINT 3.5x38mm) and STANLEY x 1 to mLAD (IFR 0.87) (XIENCE SKYPOINT 2.94K91jl).  Post procedure Femstop device applied x 1 hour to LFA site secondary to oozing with good hemostasis.  -Admitted overnight to tele secondary to meeting major admission requirements of complex anatomy (2 vessel system PCI) and CKD (GFR 55)  -Stable for discharge to home today  -Labs and EKG stable this am  -Meds: Maintain DAPT with ASA/Plavix, statin as before  -No beta blocker secondary to hypotension when previously taken; maintain current low dose ARB  -Benefits of ASA/plavix emphasized with patient verbal understanding  -Lifestyle mods/diet/activity/meds discussed with patient verbal understanding  -cardiac rehab info provided/referral and communication to cardiac rehab completed  -Follow up with Dr. Ward in one to two weeks

## 2023-02-10 NOTE — PROGRESS NOTE ADULT - SUBJECTIVE AND OBJECTIVE BOX
Interventional Cardiology NP post procedure note:     -s/p LHC/staged PCI via LFA with Dr. Ward: STANLEY x 1 to pRCA (XIENCE SKYPOINT 3.5x38mm) and STANLEY x 1 to mLAD (IFR 0.87) (XIENCE SKYPOINT 2.53W66ul) (prelim report; official report to follow)  Heparin 8,000 units IV  Plavix 300mg PO x 1  Omnipaque 156cc    EKG post PCI: NSR 63 bpm without ST elevations/depressions  TELE: NSR 60s    MEDICATIONS  (STANDING):  aspirin  chewable 81 milliGRAM(s) Oral daily  atorvastatin 40 milliGRAM(s) Oral at bedtime  chlorhexidine 4% Liquid 1 Application(s) Topical Once  clopidogrel Tablet 75 milliGRAM(s) Oral daily  finasteride 5 milliGRAM(s) Oral daily  losartan 25 milliGRAM(s) Oral daily  pantoprazole    Tablet 40 milliGRAM(s) Oral before breakfast  pregabalin 75 milliGRAM(s) Oral two times a day  sodium chloride 0.9%. 250 milliLiter(s) (250 mL/Hr) IV Continuous <Continuous>  sodium chloride 0.9%. 600 milliLiter(s) (150 mL/Hr) IV Continuous <Continuous>    MEDICATIONS  (PRN):  zolpidem 5 milliGRAM(s) Oral at bedtime PRN Insomnia  zolpidem 5 milliGRAM(s) Oral at bedtime PRN Insomnia      Allergies:  No Known Allergies    Intolerances      PAST MEDICAL & SURGICAL HISTORY:  Kidney stones      Gastritis      CAD (coronary artery disease)      NSTEMI (non-ST elevation myocardial infarction)      Hyperlipidemia      Mitral regurgitation      Total knee replacement status, right      S/P total knee replacement, left      S/P coronary artery stent placement          Vital Signs Last 24 Hrs  T(C): 36.4 (09 Feb 2023 09:23), Max: 36.4 (09 Feb 2023 09:23)  T(F): 97.6 (09 Feb 2023 09:23), Max: 97.6 (09 Feb 2023 09:23)  HR: 61 (09 Feb 2023 11:15) (61 - 66)  BP: 147/85 (09 Feb 2023 11:15) (147/85 - 166/80)  BP(mean): --  RR: 17 (09 Feb 2023 11:15) (17 - 17)  SpO2: 95% (09 Feb 2023 11:15) (95% - 99%)    Parameters below as of 09 Feb 2023 11:15  Patient On (Oxygen Delivery Method): room air        Physical Exam:  Constitutional: NAD, AAOx3  Cardiovascular: +S1S2 RRR  Pulmonary: CTA b/l, unlabored  GI: soft NTND +BS  Extremities: no pedal edema, +distal pulses b/l  Neuro: non focal, MARIE x4    LABS:                        14.7   7.66  )-----------( 337      ( 09 Feb 2023 08:36 )             45.1     02-09    138  |  102  |  21.2<H>  ----------------------------<  112<H>  4.1   |  28.0  |  1.33<H>    Ca    8.9      09 Feb 2023 08:36            RADIOLOGY & ADDITIONAL TESTS:  
Interventional Cardiology NP note:    -s/p LHC/staged PCI via LFA with Dr. Ward: STANLEY x 1 to pRCA (XIENCE SKYPOINT 3.5x38mm) and STANLEY x 1 to mLAD (IFR 0.87) (XIENCE SKYPOINT 2.63Q69wc) (see official report below)    EKG: NSR 75 bpm  TELE: NSR 70s    MEDICATIONS  (STANDING):  aspirin  chewable 81 milliGRAM(s) Oral daily  atorvastatin 40 milliGRAM(s) Oral at bedtime  clopidogrel Tablet 75 milliGRAM(s) Oral daily  finasteride 5 milliGRAM(s) Oral daily  losartan 25 milliGRAM(s) Oral daily  pantoprazole    Tablet 40 milliGRAM(s) Oral before breakfast  pregabalin 75 milliGRAM(s) Oral two times a day  sodium chloride 0.9%. 250 milliLiter(s) (250 mL/Hr) IV Continuous <Continuous>  sodium chloride 0.9%. 600 milliLiter(s) (150 mL/Hr) IV Continuous <Continuous>  tamsulosin 0.8 milliGRAM(s) Oral at bedtime    MEDICATIONS  (PRN):  zolpidem 5 milliGRAM(s) Oral at bedtime PRN Insomnia  zolpidem 5 milliGRAM(s) Oral at bedtime PRN Insomnia      Allergies:  No Known Allergies      PAST MEDICAL & SURGICAL HISTORY:  Kidney stones      Gastritis      CAD (coronary artery disease)      NSTEMI (non-ST elevation myocardial infarction)      Hyperlipidemia      Mitral regurgitation      Total knee replacement status, right      S/P total knee replacement, left      S/P coronary artery stent placement          Vital Signs Last 24 Hrs  T(C): 36.6 (10 Feb 2023 06:00), Max: 36.6 (10 Feb 2023 06:00)  T(F): 97.9 (10 Feb 2023 06:00), Max: 97.9 (10 Feb 2023 06:00)  HR: 76 (10 Feb 2023 06:00) (61 - 89)  BP: 134/72 (10 Feb 2023 06:00) (125/73 - 166/80)  BP(mean): 100 (09 Feb 2023 22:00) (90 - 100)  RR: 17 (10 Feb 2023 06:00) (17 - 17)  SpO2: 94% (09 Feb 2023 21:00) (94% - 99%)    Parameters below as of 10 Feb 2023 06:00  Patient On (Oxygen Delivery Method): room air        Physical Exam:  Constitutional: NAD, AAOx3  Cardiovascular: +S1S2 RRR  Pulmonary: CTA b/l, unlabored  GI: soft NTND +BS  Extremities: no pedal edema, +distal pulses b/l  Neuro: non focal, MARIE x4  Procedure site: LFA site with +ecchymosis extending medially 2x2 cm; soft, NT; no bruit; + left PP    LABS:                        12.7   8.58  )-----------( 293      ( 10 Feb 2023 06:02 )             39.4     02-10    137  |  104  |  22.0<H>  ----------------------------<  92  4.5   |  28.0  |  1.19    Ca    8.5      10 Feb 2023 06:02    TPro  6.0<L>  /  Alb  3.4  /  TBili  0.5  /  DBili  0.1  /  AST  21  /  ALT  16  /  AlkPhos  103  02-10          RADIOLOGY & ADDITIONAL TESTS:  < from: Cardiac Catheterization (02.09.23 @ 09:42) >  Cath Lab Report    Interventional Cardiologist:   Khanh Ward MD   Diagnostic Cardiologist:       Khanh Ward MD   Interventional Cardiologist:   Khanh Ward MD   Referring Physician:           hKanh Ward MD     Procedures Performed   Procedures:              1.    Ultrasound Guided Access     2.Arterial Access - Left Femoral   3.    IVUS   4.    PCI: STANLEY   5.    Diagnostic Coronary Angiography   6.    Left Heart Cath   7.    iFR/dFR/rFR   8.    IVUS   9.    PCI: STANLEY   10.   AngioSeal     Indications:               staged pci after nstemi     Conclusions:     1. Successful staged IVUS-guided PCI of proximal RCA and mid LAD (iFR  significant).  Prox RCA 3.5x38 post dil 4.5 mm   Mid LAD 2.75x33 post dil 4.0      2. Mildly elevated LVEDP.     Recommendations:     1. Monitor overnight on telemetry. DAPT, statin. cardiac rehab as  outpatient. close office follow up.    Acute complication:    No complications     Hemodynamic:           Hemodynamic Impressions   General Impressions: Hemodynamic assessment demonstrates mildly  elevated LVEDP.    Presentation:   history of nstemi, pci OM 1/2023, HTN, HLD here for complete  revasculariation staged pci.    Procedure Narrative:   The risks and alternatives of the procedures and conscious sedation  were explained to the patient and informed consent was    Patient: WANDER HAGEN           MRN: Study Date: 02/09/2023   09:42  AM      Page 1 of 5          obtained. The patient was brought to the cath lab and placed on the  exam table.  Access   Left femoral artery:   Vascular access was obtained using modified seldinger technique and a  6FR SHEATH 10CM PINNACLE was advanced into  the vessel. Hemostasis/Sheath Status: Hemostasis was successful using  a sealant.    Coronary Angiography   Left Coronary System:   A catheter was positioned into the vessel ostia under fluoroscopic  guidance. Angiograms were obtained in multiple views.  Right Coronary System:   A catheter was positioned into the vessel ostia under fluoroscopic  guidance.  Left Heart Cath   A JR4 was successfully advanced across the aortic valve, placed in the  left ventricle and pressures were recorded.    Diagnostic Findings:     Coronary Angiography   The coronary circulation is right dominant.      LM   Left main artery: Angiography shows mild atherosclerosis.      LAD   Mid left anterior descending: There is a 70 % stenosis. Instant  wave-free ratio was performed with a calculated value of 0.87.  Based on the results, the lesion was judged to be significant and an  intervention was performed. Proximal left anterior  descending: There is a 40 % stenosis.      CX   First obtuse marginal: There is a 0 % stenosis within the stented  segment.    RCA   Proximal right coronary artery: There is a 75 % stenosis.      Left Heart Cath   Ejection fraction was calculated by echo with a value of 55%.      Interventional Findings:     Interventional Details   Proximal right coronary artery: This was a 75 % stenosis. The lesion  length was 30 mm. This was an ACC/AHA High/C lesion  for intervention. IVUS was performed.Guidewire crossing was  successful.    A successful Balloon angioplasty was performed using a EUPHORA  2.5 X  12MM During Procedure.    The inflation pressure was 10 gemini for the duration of 3.0 seconds.     The inflation pressure was 10 gemini for the duration of 7.0 seconds.     The inflation pressure was 10 gemini for the duration of 7.0 seconds.     A successful Drug Eluting Stent was deployed using a XIENCE SKYPOINT  3.5 X 38MM During Procedure.    The inflation pressure was 14 gemini for the durationof 10.0 seconds.     A successful Balloon angioplasty was performed using a EUPHORA NC 4.5  X 8MM During Procedure.    Patient: WANDER HAGEN           MRN: Study Date: 02/09/2023   09:42  AM      Page 2 of 5            The inflation pressure was 12 gemini for the duration of 5.0 seconds.     The inflation pressure was 12 gemini for the duration of 6.0 seconds.     The inflation pressure was 12 gemini for the duration of 8.0 seconds.     The inflation pressure was 12 gemini for the duration of 6.0 seconds.    Following intervention there is a 1 % residual stenosis. There was  AUDREY Flow 3 before the procedure and AUDREY Flow 3 following the  procedure. No significant vessel dissection noted. Following  intervention there is an excellent angiographic appearance.    Mid left anterior descending: This was a 70 % stenosis. The lesion  length was 28 mm. This was an ACC/AHA High/C lesion for  intervention. Guidewire crossing was successful.      A successful Balloon angioplasty was performed using a EUPHORA  2.5 X  12MM During Procedure.    The inflation pressure was 8 gemini for the duration of 2.0 seconds.     The inflation pressure was 8 gemini for the duration of 7.0 seconds.     The inflation pressure was 8 gemini for the duration of 4.0 seconds.     A successful Drug Eluting Stent was deployed using a XIENCE SKYPOINT  2.75 X 33MM During Procedure.    The inflation pressure was 12 gemini for the duration of 14.0 seconds.     A successful Balloon angioplasty was performed using a EUPHORA NC 3.0  X 12MM During Procedure.    The inflation pressure was 10 gemini for the duration of 8.0 seconds.     The inflation pressure was 12 gemini for the duration of 6.0 seconds.     The inflation pressure was 14 gemini for the duration of 7.0 seconds.     A successful Balloonangioplasty was performed using a NC EMERGE 4.0 X  8MM During Procedure.    The inflation pressure was 10 gemini for the duration of 10.0 seconds.     The inflation pressure was 10 gemini for the duration of 8.0 seconds.     Following intervention there pillo 1 % residual stenosis. There was  AUDREY Flow 3 before the procedure and AUDREY Flow 3 following the  procedure. No significant vessel dissection noted. Following  intervention there is an excellent angiographic appearance.    < end of copied text >

## 2023-02-11 ENCOUNTER — TRANSCRIPTION ENCOUNTER (OUTPATIENT)
Age: 83
End: 2023-02-11

## 2023-02-13 ENCOUNTER — TRANSCRIPTION ENCOUNTER (OUTPATIENT)
Age: 83
End: 2023-02-13

## 2023-02-14 ENCOUNTER — RESULT CHARGE (OUTPATIENT)
Age: 83
End: 2023-02-14

## 2023-02-15 ENCOUNTER — NON-APPOINTMENT (OUTPATIENT)
Age: 83
End: 2023-02-15

## 2023-02-15 ENCOUNTER — APPOINTMENT (OUTPATIENT)
Dept: CARDIOLOGY | Facility: CLINIC | Age: 83
End: 2023-02-15
Payer: MEDICARE

## 2023-02-15 VITALS
SYSTOLIC BLOOD PRESSURE: 112 MMHG | OXYGEN SATURATION: 96 % | TEMPERATURE: 97.1 F | WEIGHT: 193 LBS | HEIGHT: 70 IN | BODY MASS INDEX: 27.63 KG/M2 | HEART RATE: 76 BPM | DIASTOLIC BLOOD PRESSURE: 70 MMHG

## 2023-02-15 PROBLEM — I34.0 NONRHEUMATIC MITRAL (VALVE) INSUFFICIENCY: Chronic | Status: ACTIVE | Noted: 2023-02-08

## 2023-02-15 PROBLEM — I25.10 ATHEROSCLEROTIC HEART DISEASE OF NATIVE CORONARY ARTERY WITHOUT ANGINA PECTORIS: Chronic | Status: ACTIVE | Noted: 2023-02-08

## 2023-02-15 PROBLEM — E78.5 HYPERLIPIDEMIA, UNSPECIFIED: Chronic | Status: ACTIVE | Noted: 2023-02-08

## 2023-02-15 PROCEDURE — 93000 ELECTROCARDIOGRAM COMPLETE: CPT

## 2023-02-15 PROCEDURE — 99215 OFFICE O/P EST HI 40 MIN: CPT

## 2023-02-15 RX ORDER — ALFUZOSIN HYDROCHLORIDE 10 MG/1
10 TABLET, EXTENDED RELEASE ORAL DAILY
Refills: 0 | Status: ACTIVE | COMMUNITY

## 2023-02-15 NOTE — HISTORY OF PRESENT ILLNESS
[FreeTextEntry1] : \par 81 yo M\par NSTEMI with MVCAD s/p PCI OM1, LAD, RCA 1/2023\par Moderate mitral regurgitation\par Gastritis\par \par 2/2023 VISIT: Underwent staged PCI of RCA and LAD.  Left CFA access site well-healing.  Dyspnea resolved.  On Plavix and aspirin.  Doing well.  Compliant.  No bleeding.  Pending cardiac rehab.\par \par 1/2023 INITIAL VISIT: here after nstemi from [facility]. rcfa and radial access sites well healing. does report atypical dyspnea consistent with brilinta side effect. no angina. ekg today without issue. does report fatigue and hypotension when on bb/arb. he was not on cv medications prior.\par \par \par TESTING I REVIEWED TODAY:

## 2023-02-15 NOTE — CARDIOLOGY SUMMARY
[de-identified] : NSR  [de-identified] : 1/2023: EF 5 5%, moderate MR [de-identified] : 2/2023 cath: RCA and LAD WILLY. \par 1/2023: OM1 willy. prox RCA 70% and mid LAD 60-70%.

## 2023-02-15 NOTE — DISCUSSION/SUMMARY
[FreeTextEntry1] : \par # NSTEMI with MVCAD s/p complete revascularization, moderate mitral regurg: EF preserved.\par -Continue aspirin/Plavix, had atypical side effects on Brilinta.  Statin.  Serial lab work.\par - holding beta blocker and cut losartan in half given hypotension. at 82 years old, his risk of hypotension is extremely high and can lead to fall\par - cardiac rehab [facility]\par -Echo in 3 months\par \par # Gastritis:\par - switched to protonix (omeprazole can decrease efficacy of plavix).\par \par \par Follow in 3 months with echo and rehab.  ER precautions given to patient.\par \par

## 2023-03-10 NOTE — ED ADULT TRIAGE NOTE - HEIGHT IN CM
Chief Complaint   Patient presents with    Hypertension     F/V X: Hypertension, benign        Subjective:   Rigoberto Adames is a 59 y.o. 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    Patient has stopped drinking alcohol via her report.    She feels well overall.    She used to see Dr. Knowles.    In the interim, patient has been doing well without having any symptoms. Patient denies having chest pain, dyspnea, palpitation, presyncope, syncope episodes.       Past Medical History:   Diagnosis Date    Acute kidney injury (HCC)     Acute necrotizing pyelonephritis     Alcohol withdrawal (HCC)     Arthritis     Bilateral legs; no formal diagnosis.    Cataract     Bilateral IOL    Cirrhosis of liver with ascites (HCC) 3/6/2017    Dental disorder     Upper and lower dentures    Diabetes mellitus, type 2 (HCC)     Oral medications    Diabetic ulcer of toe (HCC) 4/30/2020    diarrhea 2019    been going on for about a year    Foot pain, bilateral     Heart murmur     High cholesterol     HOCM (hypertrophic obstructive cardiomyopathy) (HCC)     Hyperammonemia (HCC) 6/24/2016    Hypertension     STEMI (ST elevation myocardial infarction) (HCC)     Thrombocytopenia (HCC)     Tobacco abuse 12/8/2014     Past Surgical History:   Procedure Laterality Date    TX UPPER GI ENDOSCOPY,DIAGNOSIS N/A 3/4/2022    Procedure: GASTROSCOPY;  Surgeon: Kashif Rhodes M.D.;  Location: [facility];  Service: Gastroenterology    TX UPPER GI ENDOSCOPY,LIGAT VARIX  9/25/2019    Procedure: GASTROSCOPY, WITH VARICEAL BANDING - WITH GASTRIC MAPPING;  Surgeon: Sandi Espinoza M.D.;  Location: [facility];  Service: Gastroenterology    GASTROSCOPY-ENDO  9/25/2019    Procedure: GASTROSCOPY;  Surgeon: Sanid Espinoza M.D.;  Location: [facility];  Service: Gastroenterology    PRIMARY C SECTION  2006    OTHER       c- section     Family History   Problem Relation Age of Onset    Cancer Father         stomach    Hypertension Mother      Social History     Socioeconomic History    Marital status: Single     Spouse name: Not on file    Number of children: Not on file    Years of education: Not on file    Highest education level: Not on file   Occupational History    Occupation: restaurant owner   Tobacco Use    Smoking status: Former     Packs/day: 1.00     Years: 10.00     Pack years: 10.00     Types: Cigarettes     Start date: 2015     Quit date: 2020     Years since quittin.7    Smokeless tobacco: Never   Vaping Use    Vaping Use: Never used   Substance and Sexual Activity    Alcohol use: Not Currently     Alcohol/week: 0.6 oz     Types: 1 Glasses of wine per week    Drug use: No    Sexual activity: Not on file   Other Topics Concern    Not on file   Social History Narrative    Single- 2 children.     Social Determinants of Health     Financial Resource Strain: Not on file   Food Insecurity: Not on file   Transportation Needs: Not on file   Physical Activity: Not on file   Stress: Not on file   Social Connections: Not on file   Intimate Partner Violence: Not on file   Housing Stability: Not on file     No Known Allergies  Outpatient Encounter Medications as of 3/10/2023   Medication Sig Dispense Refill    omeprazole (PRILOSEC) 20 MG delayed-release capsule TAKE 1 CAPSULE BY MOUTH ONCE DAILY 30 MINUTES BEFORE BREAKFAST MEAL      gabapentin (NEURONTIN) 100 MG Cap Take 1 Capsule by mouth 3 times a day. 90 Capsule 1    diltiazem CD (CARDIZEM CD) 360 MG ER capsule Take 1 Capsule by mouth every day. 90 Capsule 4    metFORMIN ER (GLUCOPHAGE XR) 500 MG TABLET SR 24 HR Take 1 tablet by mouth once daily 90 Tablet 11    Ascorbic Acid (VITAMIN C PO) Take 1 Tablet by mouth every day.      hydrocortisone 1 % Cream Apply 1 Application topically 2 times a day. 1 Each 3    cetirizine (ZYRTEC) 10 MG Tab Take 1 Tablet by mouth  "every day. 30 Tablet 6    metoprolol tartrate (LOPRESSOR) 100 MG Tab Take 1 Tablet by mouth 2 times a day. 180 Tablet 4    Ursodiol 500 MG Tab Take  by mouth every day.      atorvastatin (LIPITOR) 20 MG Tab TAKE 1 TABLET BY MOUTH AT BEDTIME 90 Tab 0    [DISCONTINUED] hydrOXYzine HCl (ATARAX) 25 MG Tab TAKE 1 TABLET BY MOUTH EVERY 6 TO 8 HOURS AS NEEDED FOR ITCHING (Patient not taking: Reported on 3/10/2023)       No facility-administered encounter medications on file as of 3/10/2023.     Review of Systems   Constitutional:  Negative for chills, fever, malaise/fatigue and weight loss.   HENT:  Negative for ear discharge, ear pain, hearing loss and nosebleeds.    Eyes:  Negative for blurred vision, double vision, pain and discharge.   Respiratory:  Negative for cough and shortness of breath.    Cardiovascular:  Negative for chest pain, palpitations, orthopnea, claudication, leg swelling and PND.   Gastrointestinal:  Negative for abdominal pain, blood in stool, melena, nausea and vomiting.   Genitourinary:  Negative for dysuria and hematuria.   Musculoskeletal:  Negative for falls, joint pain and myalgias.   Skin:  Negative for itching and rash.   Neurological:  Negative for dizziness, sensory change, speech change, loss of consciousness and headaches.   Endo/Heme/Allergies:  Negative for environmental allergies. Does not bruise/bleed easily.   Psychiatric/Behavioral:  Negative for depression, hallucinations and suicidal ideas.       Objective:   BP (P) 124/80 (BP Location: Left arm, Patient Position: Sitting, BP Cuff Size: Adult)   Pulse 60   Resp 16   Ht (P) 1.702 m (5' 7\")   Wt 59.9 kg (132 lb)   SpO2 96%   BMI (P) 20.67 kg/m²     Physical Exam  Vitals and nursing note reviewed.   Constitutional:       General: She is not in acute distress.     Appearance: She is not diaphoretic.   HENT:      Head: Normocephalic and atraumatic.      Right Ear: External ear normal.      Left Ear: External ear normal.   Eyes:    "   General:         Right eye: No discharge.         Left eye: No discharge.   Neck:      Thyroid: No thyromegaly.      Vascular: No JVD.   Cardiovascular:      Rate and Rhythm: Normal rate and regular rhythm.   Pulmonary:      Effort: No respiratory distress.   Abdominal:      General: There is no distension.      Tenderness: There is no abdominal tenderness.   Skin:     General: Skin is warm and dry.      Comments: Jaundiced.   Neurological:      Mental Status: She is alert and oriented to person, place, and time.      Cranial Nerves: No cranial nerve deficit.   Psychiatric:         Behavior: Behavior normal.       Assessment:     1. HOCM (hypertrophic obstructive cardiomyopathy) (HCC)        2. HTN (hypertension), malignant        3. Cirrhosis of liver with ascites, unspecified hepatic cirrhosis type (HCC)        4. 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 Type 2 diabetes mellitus without complication, unspecified whether long term insulin use (HCC)        6. High risk medication use            Medical Decision Making:  Today's Assessment / Status / Plan:   Blood pressure is well controlled.  We will continue Cardizem at 360 mg p.o. once a day.  Continue with metoprolol 100 mg p.o. twice a day.  Heart rate goal of less than 70.     177.8

## 2023-05-31 ENCOUNTER — APPOINTMENT (OUTPATIENT)
Dept: CARDIOLOGY | Facility: CLINIC | Age: 83
End: 2023-05-31
Payer: MEDICARE

## 2023-05-31 ENCOUNTER — NON-APPOINTMENT (OUTPATIENT)
Age: 83
End: 2023-05-31

## 2023-05-31 VITALS
HEIGHT: 70 IN | OXYGEN SATURATION: 96 % | DIASTOLIC BLOOD PRESSURE: 80 MMHG | HEART RATE: 66 BPM | SYSTOLIC BLOOD PRESSURE: 144 MMHG | WEIGHT: 186 LBS | BODY MASS INDEX: 26.63 KG/M2

## 2023-05-31 PROCEDURE — 93306 TTE W/DOPPLER COMPLETE: CPT

## 2023-05-31 PROCEDURE — 99215 OFFICE O/P EST HI 40 MIN: CPT

## 2023-05-31 NOTE — CARDIOLOGY SUMMARY
[de-identified] : NSR  [de-identified] : 1/2023: EF 5 5%, moderate MR [de-identified] : 2/2023 cath: RCA and LAD WILLY. \par 1/2023: OM1 willy. prox RCA 70% and mid LAD 60-70%.

## 2023-05-31 NOTE — HISTORY OF PRESENT ILLNESS
[FreeTextEntry1] : \par 84 yo M\par NSTEMI with MVCAD s/p PCI OM1, LAD, RCA 1/2023\par Moderate mitral regurgitation\par Gastritis\par \par 5/2023: feels well overall without chest pain. no recurrence of that. intermittent dyspnea unfortunately but no alarm signs/symptoms. blood pressure elevated on home readings too after lowering pharmacologic therapy last visit. Echocardiogram stable overall. aorta 4.3 cm mod MR.  \par \par 2/2023 VISIT: Underwent staged PCI of RCA and LAD.  Left CFA access site well-healing.  Dyspnea resolved.  On Plavix and aspirin.  Doing well.  Compliant.  No bleeding.  Pending cardiac rehab.\par \par 1/2023 INITIAL VISIT: here after nstemi from [facility]. rcfa and radial access sites well healing. does report atypical dyspnea consistent with brilinta side effect. no angina. ekg today without issue. does report fatigue and hypotension when on bb/arb. he was not on cv medications prior.\par \par \par TESTING I REVIEWED TODAY: Excluding above\par

## 2023-05-31 NOTE — DISCUSSION/SUMMARY
[FreeTextEntry1] : \par 5/2023: feels well overall without chest pain. no recurrence of that. intermittent dyspnea unfortunately but no alarm signs/symptoms. blood pressure elevated on home readings too after lowering pharmacologic therapy last visit. Echocardiogram stable overall. aorta 4.3 cm mod MR.  \par \par # NSTEMI with MVCAD s/p complete revascularization, moderate mitral regurg: EF preserved.\par -Continue aspirin/Plavix, had atypical side effects on Brilinta.  Prefer long-term.  Statin.  Serial lab work.\par - increase losartan back to 25. hydrate. Lower dose antihypertensives given hypotension. at 82 years old, his risk of hypotension is extremely high and can lead to fall\par - cardiac rehab [facility]\par - Echo yearly\par \par # Gastritis:\par - had switched to protonix (omeprazole can decrease efficacy of plavix).\par \par Follow in 3 months with rehab labs.  ER precautions given to patient.\par

## 2023-06-06 ENCOUNTER — NON-APPOINTMENT (OUTPATIENT)
Age: 83
End: 2023-06-06

## 2023-06-07 NOTE — H&P PST ADULT - LAST CARDIAC ANGIOGRAM/IMAGING
OCCUPATIONAL THERAPY EVALUATION   Type of Visit: Evaluation    See electronic medical record for Abuse and Falls Screening details.    Patient presents s/p R distal humerus ORIF with intercondylar extension on 5/3/23 and right reverse total shoulder arthroplasty on 5/5/23. Imaging revealed hardware failure of right olecranon plate and pt underwent revision ORIF of right olecranon on 6/1/23. Patient was noted to have near full extension, excellent flexion, and full pronation and supination during surgery. Placed in a long arm splint at 30 degree of flexion post op.     Per recent MD visit on 6/12/23, patient to begin hand therapy to work on A/PROM. Pt to remain NWB RUE until the week of Nanette 10.     Precautions: RUE NWB until the week of Nanette 10, h/o multiple falls   Post: 1w 4d s/p revision olecranon ORIF; 5w 5d s/p distal humerus ORIF, 5w 3d s/p reverse TSA     Subjective      Presenting condition or subjective complaint:  R elbow pain and stiffness   Date of onset:  (6/1/23 R revision olecranon ORIF, 5/3/23 R distal humerus ORIF, R reverse TSA on 5/5/23)   R distal humerus ORIF on 5/3/23, R revision olecranon ORIF on 6/1/23   Relevant medical history:     Past Medical History:   Diagnosis Date     Abnormal Papanicolaou smear of cervix and cervical HPV      Allergic rhinitis, cause unspecified     seasonal allergies     Cerebral infarction (H)      Contact dermatitis and other eczema, due to unspecified cause      Endometriosis, site unspecified      Esophageal reflux     GERD     Migraine, unspecified, without mention of intractable migraine without mention of status migrainosus      Mild persistent asthma      Unspecified disorder of uterus     fibroid uterus   Dates & types of surgery:     Past Surgical History:   Procedure Laterality Date     COLONOSCOPY  5/30/2014    Procedure: COLONOSCOPY;  Surgeon: Nathan Baker MD;  Location: [facility]     OPEN REDUCTION INTERNAL FIXATION ELBOW Right 6/1/2023    Procedure:  REVISION OPEN REDUCTION INTERNAL FIXATION RIGHT OLECRANON;  Surgeon: Williams Phipps MD;  Location: [facility]     OPEN REDUCTION INTERNAL FIXATION HUMERUS DISTAL Right 5/3/2023    Procedure: OPEN REDUCTION INTERNAL FIXATION, FRACTURE, HUMERUS, DISTAL;  Surgeon: Williams Phipps MD;  [facility]     REMOVE HARDWARE ELBOW Right 2023    Procedure: HARDWARE REMOVAL RIGHT OLECRANON;  Surgeon: Williams Phipps MD;  Location: [facility]     REVERSE ARTHROPLASTY SHOULDER Right 2023    Procedure: ARTHROPLASTY, SHOULDER, TOTAL, REVERSE for;  Surgeon: Cierra Krause MD;  Location: [facility]     ZZC  DELIVERY ONLY       X2     ZZHC COLONOSCOPY THRU STOMA, DIAGNOSTIC  2002    normal     Prior diagnostic imaging/testing results:     x-ray of Right Elbow (1. Ongoing healing of comminuted distal humeral fracture status post ORIF. Alignment appears stable. 2. Revision olecranon ORIF appears similar to 2023)  Prior therapy history for the same diagnosis, illness or injury:    Home PT (plans to continue home PT)     Living Environment  Social support: Alone   Type of home: House   Stairs to enter the home: Yes 2 Is there a railing: Yes   Ramp: No   Stairs inside the home: Yes 7 Is there a railing: Yes   Help at home: Self Cares (home health aide/personal care attendant, family, etc); Home management tasks (cooking, cleaning)  Employment: No       Patient goals for therapy:  Return to functional use of right arm during self-care and IADLs      Objective   ADDITIONAL HISTORY:  Right hand dominant  Patient reports symptoms of pain, stiffness/loss of motion, weakness/loss of strength and edema  Transportation: unable to use normal mode of transportation, due to current injury  Currently not working, retired     Functional Outcome Measure:   Upper Extremity Functional Index Score:  SCORE:   Column Totals: /80: 36   (A lower score indicates greater disability.)    PAIN:  Pain Level (Scale 0-10):   2023   At Rest 5/10    With  Use 6/10      Pain Description:  Date 6/12/2023   Location R elbow (volar, around ulnotrochelar joint   Pain Quality Sharp   Frequency intermittent or constant     Pain is worst  daytime or nighttime   Exacerbated by  with use    Relieved by rest   Progression Reports slight worsened pain since getting post op splint taken off 6/12     EDEMA: Moderate edema noted around R elbow joint and proximal/distal upper arm; mild edema in right hand     SCAR/WOUND: closed, no active bleeding or drainage observed - see photo            SENSATION: WNL throughout all nerve distributions; per patient report     ROM:   Elbow ROM  Left AROM Right AROM    Extension -2 -50    Flexion 155 91    Supination 78 3   Pronation 77 55     Wrist ROM  Left AROM Right AROM    Extension 58 30   Flexion 70 45     Digit ROM   R hand: able to make a loose fist, WFL digit extension     RESISTED TESTING: Contraindicated     STRENGTH: Contraindicated    Assessment & Plan   CLINICAL IMPRESSIONS   Medical Diagnosis: R distal humerus ORIF, revision olecranon ORIF    Treatment Diagnosis: R elbow pain and stiffness    Impression/Assessment: Patient's limitations or Problem List includes: Pain, Decreased ROM/motion, Increased edema, Weakness and Tightness in musculature of the right elbow, wrist and hand which interferes with the patient's ability to perform Self Care Tasks (dressing, bathing), Work Tasks, Sleep Patterns, Recreational Activities, Household Chores and Driving  as compared to previous level of function.    Clinical Decision Making (Complexity):   Assessment of Occupational Performance: 5 or more Performance Deficits  Occupational Performance Limitations: bathing/showering, dressing, functional mobility, driving and community mobility, home establishment and management, meal preparation and cleanup, shopping, work and leisure activities  Clinical Decision Making (Complexity): Low complexity    PLAN OF CARE  Treatment Interventions:    Modalities:  Paraffin  Therapeutic Exercise:  AROM, AAROM, PROM, Tendon Gliding, Blocking, Reverse Blocking, Place and Hold, Contract Relax, Extensor Tracking, Isotonics and Isometrics  Neuromuscular re-education:  Proprioceptive Training and Isometrics  Manual Techniques:  Friction massage and Myofascial release  Orthotic Fabrication:  Static  Self Care:  Self Care Tasks, Ergonomic Considerations and Work Tasks    Long Term Goals   OT Goal 1  Goal Identifier: Dressing  Goal Description: Fastening buttons with pain no more than 2/10 (R)  Rationale: In order to maximize safety and independence with performance of self-care activities  Target Date: 08/07/23      Frequency of Treatment: 1x/week  Duration of Treatment: 8 weeks     Education Assessment: Learner/Method: Patient;No Barriers to Learning (Patient's friend, Bryson, present during session)     Risks and benefits of evaluation/treatment have been explained.   Patient/Family/caregiver agrees with Plan of Care.     Evaluation Time:    OT Avery Low Complexity Minutes (07497): 30     Signing Clinician: GAGAN Way [facility]                                                                                   OUTPATIENT OCCUPATIONAL THERAPY      PLAN OF TREATMENT FOR OUTPATIENT REHABILITATION   Patient's Last Name, First Name, NORABEREKET GrewalSri HOUSTON YOB: 1950   Provider's Name   [facility]   Medical Record No.  [id number]     Onset Date:  (6/1/23 R revision olecranon ORIF, 5/3/23 R distal humerus ORIF, R reverse TSA on 5/5/23) Start of Care Date: 06/12/23     Medical Diagnosis:  R distal humerus ORIF, revision olecranon ORIF      OT Treatment Diagnosis:  R elbow pain and stiffness Plan of Treatment  Frequency/Duration:1x/week/8 weeks    Certification date from 06/12/23   To 08/07/23        See note for plan of treatment details and functional goals     GAGAN Way  CERTIFY THE NEED FOR THESE SERVICES FURNISHED UNDER        THIS PLAN OF TREATMENT AND WHILE UNDER MY CARE     (Physician attestation of this document indicates review and certification of the therapy plan).                  Referring Provider:  Williams Mayo       Initial Assessment  See Epic Evaluation- 06/12/23                   see HPI

## 2023-06-20 ENCOUNTER — APPOINTMENT (OUTPATIENT)
Dept: CARDIOLOGY | Facility: CLINIC | Age: 83
End: 2023-06-20
Payer: MEDICARE

## 2023-06-20 VITALS
SYSTOLIC BLOOD PRESSURE: 136 MMHG | HEIGHT: 70 IN | WEIGHT: 189 LBS | HEART RATE: 70 BPM | OXYGEN SATURATION: 96 % | BODY MASS INDEX: 27.06 KG/M2 | DIASTOLIC BLOOD PRESSURE: 62 MMHG

## 2023-06-20 PROCEDURE — 99213 OFFICE O/P EST LOW 20 MIN: CPT

## 2023-06-20 NOTE — PHYSICAL EXAM
[Well Developed] : well developed [Well Nourished] : well nourished [Normal S1, S2] : normal S1, S2 [Clear Lung Fields] : clear lung fields

## 2023-06-20 NOTE — CARDIOLOGY SUMMARY
[de-identified] : NSR  [de-identified] : 1/2023: EF 5 5%, moderate MR [de-identified] : 2/2023 cath: RCA and LAD WILLY. \par 1/2023: OM1 willy. prox RCA 70% and mid LAD 60-70%.

## 2023-06-20 NOTE — HISTORY OF PRESENT ILLNESS
[FreeTextEntry1] : 84 yo M\par NSTEMI with MVCAD s/p PCI OM1, LAD, RCA 1/2023\par Moderate mitral regurgitation\par Gastritis\par HTN\par \par 6/20/23: Patient presents today for urgent evaluation.  Was at the dentist office yesterday for procedure and blood pressure was noted to be significantly elevated.  Patient was asymptomatic.  He does keep track of his home pressures with an automatic monitor.  Monitor evaluated today and found to be reading high high.  Blood pressure on my evaluation was 108/58 mmHg.  Home machine was 131/72 mmHg.  When home blood pressures reviewed and values corrected, patient's average blood pressures 120s over 80s.\par \par 5/2023: feels well overall without chest pain. no recurrence of that. intermittent dyspnea unfortunately but no alarm signs/symptoms. blood pressure elevated on home readings too after lowering pharmacologic therapy last visit. Echocardiogram stable overall. aorta 4.3 cm mod MR.  \par \par 2/2023 VISIT: Underwent staged PCI of RCA and LAD.  Left CFA access site well-healing.  Dyspnea resolved.  On Plavix and aspirin.  Doing well.  Compliant.  No bleeding.  Pending cardiac rehab.\par \par 1/2023 INITIAL VISIT: here after nstemi from [facility]. rcfa and radial access sites well healing. does report atypical dyspnea consistent with brilinta side effect. no angina. ekg today without issue. does report fatigue and hypotension when on bb/arb. he was not on cv medications prior.\par \par \par TESTING I REVIEWED TODAY: Excluding above\par

## 2023-06-20 NOTE — REASON FOR VISIT
[Symptom and Test Evaluation] : symptom and test evaluation [CV Risk Factors and Non-Cardiac Disease] : CV risk factors and non-cardiac disease [Hypertension] : hypertension [Coronary Artery Disease] : coronary artery disease

## 2023-06-20 NOTE — DISCUSSION/SUMMARY
[FreeTextEntry1] : \par 6/20/23: feels well overall without chest pain. no recurrence of that. intermittent dyspnea unfortunately but no alarm signs/symptoms. blood pressure elevated on home readings, but home monitor has been deemed inaccurate.  Overall BP is well controlled.  Has significant reactive HTN, but when allowed to relax, his BP is significantly lower.  Echocardiogram stable overall. aorta 4.3 cm mod MR.  No change to pharmacologic therapy at this time. \par \par # NSTEMI with MVCAD s/p complete revascularization, moderate mitral regurg: EF preserved.\par -Continue aspirin/Plavix, had atypical side effects on Brilinta.  Prefer long-term.  Statin.  Serial lab work.\par - Hydrate. Lower dose antihypertensives given hypotension. at 82 years old, his risk of hypotension is extremely high and can lead to fall\par - cardiac rehab [facility]\par - Echo yearly\par \par # Gastritis:\par - had switched to protonix (omeprazole can decrease efficacy of plavix).\par \par # HTN: Discussed proper blood pressure monitoring technique to include b.i.d. monitoring at least one hour after taking hypertensive medication. Patient should rest for a minimum of 5-10 minutes before checking pressure. Arm to be supported with palm up at heart height. \par \par Follow up as scheduled.  ER precautions given to patient.\par

## 2023-07-02 ENCOUNTER — NON-APPOINTMENT (OUTPATIENT)
Age: 83
End: 2023-07-02

## 2023-07-07 ENCOUNTER — NON-APPOINTMENT (OUTPATIENT)
Age: 83
End: 2023-07-07

## 2023-07-17 RX ORDER — PANTOPRAZOLE 40 MG/1
40 TABLET, DELAYED RELEASE ORAL DAILY
Qty: 90 | Refills: 3 | Status: ACTIVE | COMMUNITY
Start: 2023-01-23 | End: 1900-01-01

## 2023-08-01 ENCOUNTER — LABORATORY RESULT (OUTPATIENT)
Age: 83
End: 2023-08-01

## 2023-08-07 ENCOUNTER — LABORATORY RESULT (OUTPATIENT)
Age: 83
End: 2023-08-07

## 2023-08-07 ENCOUNTER — NON-APPOINTMENT (OUTPATIENT)
Age: 83
End: 2023-08-07

## 2023-08-09 ENCOUNTER — APPOINTMENT (OUTPATIENT)
Dept: CARDIOLOGY | Facility: CLINIC | Age: 83
End: 2023-08-09
Payer: MEDICARE

## 2023-08-09 VITALS
HEART RATE: 63 BPM | OXYGEN SATURATION: 97 % | HEIGHT: 70 IN | WEIGHT: 183 LBS | BODY MASS INDEX: 26.2 KG/M2 | SYSTOLIC BLOOD PRESSURE: 134 MMHG | DIASTOLIC BLOOD PRESSURE: 72 MMHG

## 2023-08-09 PROCEDURE — 99214 OFFICE O/P EST MOD 30 MIN: CPT

## 2023-08-09 NOTE — HISTORY OF PRESENT ILLNESS
[FreeTextEntry1] : 84 yo M NSTEMI with MVCAD s/p PCI OM1, LAD, RCA 1/2023 Moderate mitral regurgitation Gastritis  8/2023: Has worsening creatinine unclear etiology.  Dental work in interim.  Came in in interim with high blood pressure. has orthostatic sx intermittently. no exertional symptoms. random atypical left chest pain.   5/2023: feels well overall without chest pain. no recurrence of that. intermittent dyspnea unfortunately but no alarm signs/symptoms. blood pressure elevated on home readings too after lowering pharmacologic therapy last visit. Echocardiogram stable overall. aorta 4.3 cm mod MR.    2/2023 VISIT: Underwent staged PCI of RCA and LAD.  Left CFA access site well-healing.  Dyspnea resolved.  On Plavix and aspirin.  Doing well.  Compliant.  No bleeding.  Pending cardiac rehab.  1/2023 INITIAL VISIT: here after nstemi from [facility]. rcfa and radial access sites well healing. does report atypical dyspnea consistent with brilinta side effect. no angina. ekg today without issue. does report fatigue and hypotension when on bb/arb. he was not on cv medications prior.   TESTING I REVIEWED TODAY: Excluding above

## 2023-08-09 NOTE — DISCUSSION/SUMMARY
[FreeTextEntry1] :  # NSTEMI with MVCAD s/p complete revascularization, moderate mitral regurg: EF preserved. - Continue aspirin/Plavix. Prefer long-term.  Statin.  Serial lab work. - losartan 25. hydrate. Lower dose antihypertensives given hypotension. at 82 years old, his risk of hypotension is extremely high and can lead to fall - cardiac rehab [facility] - Echo and nuclear stress given atypical symptoms   #CKD worsening: unclear etiology - nephrology, urology, hydrate. consider prostate.   # Gastritis: had switched to protonix (omeprazole can decrease efficacy of plavix).  Follow after testing. ER precautions given to patient.

## 2023-08-09 NOTE — CARDIOLOGY SUMMARY
[de-identified] : NSR  [de-identified] : 1/2023: EF 55%, moderate MR [de-identified] : 2/2023 cath: RCA and LAD WILLY. \par  1/2023: OM1 willy. prox RCA 70% and mid LAD 60-70%.

## 2023-08-10 ENCOUNTER — INPATIENT (INPATIENT)
Facility: HOSPITAL | Age: 83
LOS: 0 days | Discharge: HOME | DRG: 305 | End: 2023-08-11
Attending: STUDENT IN AN ORGANIZED HEALTH CARE EDUCATION/TRAINING PROGRAM | Admitting: STUDENT IN AN ORGANIZED HEALTH CARE EDUCATION/TRAINING PROGRAM
Payer: COMMERCIAL

## 2023-08-10 ENCOUNTER — NON-APPOINTMENT (OUTPATIENT)
Age: 83
End: 2023-08-10

## 2023-08-10 VITALS
HEIGHT: 69 IN | OXYGEN SATURATION: 96 % | HEART RATE: 97 BPM | SYSTOLIC BLOOD PRESSURE: 184 MMHG | WEIGHT: 179.9 LBS | DIASTOLIC BLOOD PRESSURE: 136 MMHG | RESPIRATION RATE: 20 BRPM | TEMPERATURE: 97 F

## 2023-08-10 DIAGNOSIS — Z95.5 PRESENCE OF CORONARY ANGIOPLASTY IMPLANT AND GRAFT: Chronic | ICD-10-CM

## 2023-08-10 DIAGNOSIS — Z96.651 PRESENCE OF RIGHT ARTIFICIAL KNEE JOINT: Chronic | ICD-10-CM

## 2023-08-10 DIAGNOSIS — Z96.652 PRESENCE OF LEFT ARTIFICIAL KNEE JOINT: Chronic | ICD-10-CM

## 2023-08-10 LAB
ALBUMIN SERPL ELPH-MCNC: 3.6 G/DL — SIGNIFICANT CHANGE UP (ref 3.3–5.2)
ALBUMIN SERPL ELPH-MCNC: 3.7 G/DL — SIGNIFICANT CHANGE UP (ref 3.3–5.2)
ALP SERPL-CCNC: 168 U/L — HIGH (ref 40–120)
ALP SERPL-CCNC: 189 U/L — HIGH (ref 40–120)
ALT FLD-CCNC: 20 U/L — SIGNIFICANT CHANGE UP
ALT FLD-CCNC: 23 U/L — SIGNIFICANT CHANGE UP
ANION GAP SERPL CALC-SCNC: 13 MMOL/L — SIGNIFICANT CHANGE UP (ref 5–17)
ANION GAP SERPL CALC-SCNC: 16 MMOL/L — SIGNIFICANT CHANGE UP (ref 5–17)
APPEARANCE UR: CLEAR — SIGNIFICANT CHANGE UP
AST SERPL-CCNC: 22 U/L — SIGNIFICANT CHANGE UP
AST SERPL-CCNC: 27 U/L — SIGNIFICANT CHANGE UP
BACTERIA # UR AUTO: ABNORMAL
BASOPHILS # BLD AUTO: 0.03 K/UL — SIGNIFICANT CHANGE UP (ref 0–0.2)
BASOPHILS NFR BLD AUTO: 0.3 % — SIGNIFICANT CHANGE UP (ref 0–2)
BILIRUB SERPL-MCNC: 0.8 MG/DL — SIGNIFICANT CHANGE UP (ref 0.4–2)
BILIRUB SERPL-MCNC: 1 MG/DL — SIGNIFICANT CHANGE UP (ref 0.4–2)
BILIRUB UR-MCNC: NEGATIVE — SIGNIFICANT CHANGE UP
BUN SERPL-MCNC: 23.5 MG/DL — HIGH (ref 8–20)
BUN SERPL-MCNC: 23.7 MG/DL — HIGH (ref 8–20)
CALCIUM SERPL-MCNC: 8.5 MG/DL — SIGNIFICANT CHANGE UP (ref 8.4–10.5)
CALCIUM SERPL-MCNC: 9.1 MG/DL — SIGNIFICANT CHANGE UP (ref 8.4–10.5)
CHLORIDE SERPL-SCNC: 102 MMOL/L — SIGNIFICANT CHANGE UP (ref 96–108)
CHLORIDE SERPL-SCNC: 103 MMOL/L — SIGNIFICANT CHANGE UP (ref 96–108)
CO2 SERPL-SCNC: 20 MMOL/L — LOW (ref 22–29)
CO2 SERPL-SCNC: 20 MMOL/L — LOW (ref 22–29)
COLOR SPEC: YELLOW — SIGNIFICANT CHANGE UP
CREAT SERPL-MCNC: 1.77 MG/DL — HIGH (ref 0.5–1.3)
CREAT SERPL-MCNC: 1.84 MG/DL — HIGH (ref 0.5–1.3)
DIFF PNL FLD: ABNORMAL
EGFR: 36 ML/MIN/1.73M2 — LOW
EGFR: 38 ML/MIN/1.73M2 — LOW
EOSINOPHIL # BLD AUTO: 0.09 K/UL — SIGNIFICANT CHANGE UP (ref 0–0.5)
EOSINOPHIL NFR BLD AUTO: 1 % — SIGNIFICANT CHANGE UP (ref 0–6)
EPI CELLS # UR: SIGNIFICANT CHANGE UP
GLUCOSE SERPL-MCNC: 105 MG/DL — HIGH (ref 70–99)
GLUCOSE SERPL-MCNC: 99 MG/DL — SIGNIFICANT CHANGE UP (ref 70–99)
GLUCOSE UR QL: NEGATIVE MG/DL — SIGNIFICANT CHANGE UP
HCT VFR BLD CALC: 38.8 % — LOW (ref 39–50)
HGB BLD-MCNC: 13.2 G/DL — SIGNIFICANT CHANGE UP (ref 13–17)
IMM GRANULOCYTES NFR BLD AUTO: 0.3 % — SIGNIFICANT CHANGE UP (ref 0–0.9)
KETONES UR-MCNC: ABNORMAL
LEUKOCYTE ESTERASE UR-ACNC: ABNORMAL
LYMPHOCYTES # BLD AUTO: 0.61 K/UL — LOW (ref 1–3.3)
LYMPHOCYTES # BLD AUTO: 7.1 % — LOW (ref 13–44)
MCHC RBC-ENTMCNC: 29.9 PG — SIGNIFICANT CHANGE UP (ref 27–34)
MCHC RBC-ENTMCNC: 34 GM/DL — SIGNIFICANT CHANGE UP (ref 32–36)
MCV RBC AUTO: 87.8 FL — SIGNIFICANT CHANGE UP (ref 80–100)
MONOCYTES # BLD AUTO: 0.54 K/UL — SIGNIFICANT CHANGE UP (ref 0–0.9)
MONOCYTES NFR BLD AUTO: 6.3 % — SIGNIFICANT CHANGE UP (ref 2–14)
NEUTROPHILS # BLD AUTO: 7.34 K/UL — SIGNIFICANT CHANGE UP (ref 1.8–7.4)
NEUTROPHILS NFR BLD AUTO: 85 % — HIGH (ref 43–77)
NITRITE UR-MCNC: NEGATIVE — SIGNIFICANT CHANGE UP
PH UR: 6 — SIGNIFICANT CHANGE UP (ref 5–8)
PLATELET # BLD AUTO: 306 K/UL — SIGNIFICANT CHANGE UP (ref 150–400)
POTASSIUM SERPL-MCNC: 3.9 MMOL/L — SIGNIFICANT CHANGE UP (ref 3.5–5.3)
POTASSIUM SERPL-MCNC: 4.3 MMOL/L — SIGNIFICANT CHANGE UP (ref 3.5–5.3)
POTASSIUM SERPL-SCNC: 3.9 MMOL/L — SIGNIFICANT CHANGE UP (ref 3.5–5.3)
POTASSIUM SERPL-SCNC: 4.3 MMOL/L — SIGNIFICANT CHANGE UP (ref 3.5–5.3)
PROT SERPL-MCNC: 6.3 G/DL — LOW (ref 6.6–8.7)
PROT SERPL-MCNC: 6.9 G/DL — SIGNIFICANT CHANGE UP (ref 6.6–8.7)
PROT UR-MCNC: SIGNIFICANT CHANGE UP MG/DL
RBC # BLD: 4.42 M/UL — SIGNIFICANT CHANGE UP (ref 4.2–5.8)
RBC # FLD: 13.5 % — SIGNIFICANT CHANGE UP (ref 10.3–14.5)
RBC CASTS # UR COMP ASSIST: SIGNIFICANT CHANGE UP /HPF (ref 0–4)
SODIUM SERPL-SCNC: 136 MMOL/L — SIGNIFICANT CHANGE UP (ref 135–145)
SODIUM SERPL-SCNC: 137 MMOL/L — SIGNIFICANT CHANGE UP (ref 135–145)
SP GR SPEC: 1.01 — SIGNIFICANT CHANGE UP (ref 1.01–1.02)
TROPONIN T SERPL-MCNC: <0.01 NG/ML — SIGNIFICANT CHANGE UP (ref 0–0.06)
UROBILINOGEN FLD QL: NEGATIVE MG/DL — SIGNIFICANT CHANGE UP
WBC # BLD: 8.64 K/UL — SIGNIFICANT CHANGE UP (ref 3.8–10.5)
WBC # FLD AUTO: 8.64 K/UL — SIGNIFICANT CHANGE UP (ref 3.8–10.5)
WBC UR QL: SIGNIFICANT CHANGE UP /HPF (ref 0–5)

## 2023-08-10 PROCEDURE — 99285 EMERGENCY DEPT VISIT HI MDM: CPT

## 2023-08-10 PROCEDURE — 93010 ELECTROCARDIOGRAM REPORT: CPT

## 2023-08-10 RX ORDER — LABETALOL HCL 100 MG
20 TABLET ORAL ONCE
Refills: 0 | Status: COMPLETED | OUTPATIENT
Start: 2023-08-10 | End: 2023-08-10

## 2023-08-10 RX ORDER — ONDANSETRON 8 MG/1
4 TABLET, FILM COATED ORAL ONCE
Refills: 0 | Status: COMPLETED | OUTPATIENT
Start: 2023-08-10 | End: 2023-08-10

## 2023-08-10 RX ORDER — SODIUM CHLORIDE 9 MG/ML
1000 INJECTION INTRAMUSCULAR; INTRAVENOUS; SUBCUTANEOUS ONCE
Refills: 0 | Status: COMPLETED | OUTPATIENT
Start: 2023-08-10 | End: 2023-08-10

## 2023-08-10 RX ADMIN — Medication 20 MILLIGRAM(S): at 20:42

## 2023-08-10 RX ADMIN — ONDANSETRON 4 MILLIGRAM(S): 8 TABLET, FILM COATED ORAL at 18:35

## 2023-08-10 RX ADMIN — SODIUM CHLORIDE 1000 MILLILITER(S): 9 INJECTION INTRAMUSCULAR; INTRAVENOUS; SUBCUTANEOUS at 19:38

## 2023-08-10 NOTE — ED PROVIDER NOTE - NSICDXPASTMEDICALHX_GEN_ALL_CORE_FT
PAST MEDICAL HISTORY:  CAD (coronary artery disease)     Gastritis     Hyperlipidemia     Kidney stones     Mitral regurgitation     NSTEMI (non-ST elevation myocardial infarction) 02/2023; aspirin and plavix

## 2023-08-10 NOTE — ED PROVIDER NOTE - PHYSICAL EXAMINATION
General: Well appearing in no acute distress, alert and cooperative  Head: Normocephalic, atraumatic  Eyes: PERRLA, no conjunctival injection, no scleral icterus, EOMI  ENMT: Atraumatic external nose and ears, moist mucous membranes, oropharynx clear  Neck: Soft and supple, full ROM without pain, no midline tenderness, no thyromegaly,  no lymphadenopathy  Cardiac: Regular rate and regular rhythm, + murmur, peripheral pulses 2+ and symmetric in all extremities, no LE edema.  Resp: Unlabored respiratory effort, lungs CTAB, speaking in full sentences, no wheezes  Abd: Soft, non-tender, non-distended, no guarding or rebound  MSK: Spine midline and non-tender, no CVA tenderness   Skin: Warm and dry, two ecchymoses on R forearm  Neuro: AO x 3, moves all extremities symmetrically,

## 2023-08-10 NOTE — ED PROVIDER NOTE - ATTENDING CONTRIBUTION TO CARE
I have personally performed a history and physical examination of the patient and discussed management with the resident as well as the patient.  I reviewed the resident's note and agree with the documented findings and plan of care.  I have authored and modified critical sections of the Provider Note, including but not limited to HPI, Physical Exam and MDM.    82 y/o male with sig pmh of kidney stones, HTN, HLD, neuropathy, MIx1 02/2023 on aspirin and plavix, GERD, BPH presenting with nausea x 2 days. Patient started feeling nauseous last night with concomittent lightheadedness/dizziness.  Possible ACS versus TBI versus hypertensive urgency.  Obtain CBC, CMP, TNI, EKG, CT head.  Consider PNA, obtain CXR.  Symptomatic control as needed, consider BP management pending results.  Independent review of results showing SERA of 1.8 on creatinine, will give medication for BP control and IV hydration.  Repeat BMP.  Inpatient versus outpatient treatment pending response.

## 2023-08-10 NOTE — ED PROVIDER NOTE - OBJECTIVE STATEMENT
Patient is a 82 y/o male with sig pmh of kidney stones, HTN, neuropathy, Patient is a 84 y/o male with sig pmh of kidney stones, HTN, HLD, neuropathy, MIx1 02/2023 on aspirin and plavix, GERD, BPH presenting with nausea x 2 days. Patient started feeling nauseous last night with concomittent lightheadedness/dizziness. Endorses a "roll" fall while getting water on the nightstand from the bed due to lightheadedness, fell on both hands and hit his L face against the mattress. He's nauseous today, with one episode of diffuse chest pressure, resolved after a few minutes. Denies fevers, chills, headache, chest pain, palpitations, shortness of breath, cough,vomiting, diarrhea, hematuria, dysuria, dark stools, focal neurologic symptoms.

## 2023-08-10 NOTE — ED ADULT NURSE REASSESSMENT NOTE - NS ED NURSE REASSESS COMMENT FT1
Assumed care of patient at this time, patient denies pain, denies sob, RR even and unlabored, patient is hypertensive, MD notified , waiting for ct.

## 2023-08-10 NOTE — ED PROVIDER NOTE - NS ED ROS FT
Constitutional: no fever, no chills  Head: NC, AT   Eyes: no redness   ENMT: no nasal congestion/drainage, no sore throat   CV: no chest pain, no edema  Resp: no cough, no dyspnea  GI: no abdominal pain, no nausea, no vomiting, no diarrhea  : no dysuria, no hematuria   Skin: no lesions, no rashes   Neuro: no LOC, no headache, no sensory deficits, no weakness, +lightheadedness, +dizziness

## 2023-08-10 NOTE — ED PROVIDER NOTE - CLINICAL SUMMARY MEDICAL DECISION MAKING FREE TEXT BOX
Patient is a 84 y/o male with sig pmh of kidney stones, HTN, HLD, neuropathy, MIx1 02/2023 on aspirin and plavix, GERD, BPH presenting with nausea x 2 days. Patient started feeling nauseous last night with concomittent lightheadedness/dizziness, which caused a light fall. one episode of diffuse chest pressure, resolved after a few minutes.  Differential diagnosis includes but not limited to GERD, angina, electrolyte abnormalities

## 2023-08-10 NOTE — ED ADULT NURSE NOTE - OBJECTIVE STATEMENT
Aox3. Pt presenting to Emergency Department complaining of chest pain, dizziness, nausea and high blood pressure x 2 days. Pt states the chest pain comes and goes. Pt denies SOB, back pain, abd pain, headaches, diarrhaea, fevers, chills, dysuria and bleeding. Pt on cardiac monitor in NSR 72 HR and  at 97% RA. Respirations even and unlabored. Skin warm to touch.

## 2023-08-11 ENCOUNTER — TRANSCRIPTION ENCOUNTER (OUTPATIENT)
Age: 83
End: 2023-08-11

## 2023-08-11 VITALS
SYSTOLIC BLOOD PRESSURE: 146 MMHG | RESPIRATION RATE: 18 BRPM | HEART RATE: 71 BPM | DIASTOLIC BLOOD PRESSURE: 76 MMHG | TEMPERATURE: 97 F | OXYGEN SATURATION: 95 %

## 2023-08-11 DIAGNOSIS — R07.89 OTHER CHEST PAIN: ICD-10-CM

## 2023-08-11 DIAGNOSIS — I25.10 ATHEROSCLEROTIC HEART DISEASE OF NATIVE CORONARY ARTERY WITHOUT ANGINA PECTORIS: ICD-10-CM

## 2023-08-11 DIAGNOSIS — I16.1 HYPERTENSIVE EMERGENCY: ICD-10-CM

## 2023-08-11 DIAGNOSIS — I10 ESSENTIAL (PRIMARY) HYPERTENSION: ICD-10-CM

## 2023-08-11 LAB
ANION GAP SERPL CALC-SCNC: 13 MMOL/L — SIGNIFICANT CHANGE UP (ref 5–17)
BUN SERPL-MCNC: 24.9 MG/DL — HIGH (ref 8–20)
CALCIUM SERPL-MCNC: 8.7 MG/DL — SIGNIFICANT CHANGE UP (ref 8.4–10.5)
CHLORIDE SERPL-SCNC: 103 MMOL/L — SIGNIFICANT CHANGE UP (ref 96–108)
CHOLEST SERPL-MCNC: 103 MG/DL — SIGNIFICANT CHANGE UP
CO2 SERPL-SCNC: 21 MMOL/L — LOW (ref 22–29)
CREAT SERPL-MCNC: 1.82 MG/DL — HIGH (ref 0.5–1.3)
EGFR: 37 ML/MIN/1.73M2 — LOW
GLUCOSE SERPL-MCNC: 101 MG/DL — HIGH (ref 70–99)
HDLC SERPL-MCNC: 33 MG/DL — LOW
LIPID PNL WITH DIRECT LDL SERPL: 54 MG/DL — SIGNIFICANT CHANGE UP
NON HDL CHOLESTEROL: 70 MG/DL — SIGNIFICANT CHANGE UP
POTASSIUM SERPL-MCNC: 4 MMOL/L — SIGNIFICANT CHANGE UP (ref 3.5–5.3)
POTASSIUM SERPL-SCNC: 4 MMOL/L — SIGNIFICANT CHANGE UP (ref 3.5–5.3)
SODIUM SERPL-SCNC: 137 MMOL/L — SIGNIFICANT CHANGE UP (ref 135–145)
TRIGL SERPL-MCNC: 82 MG/DL — SIGNIFICANT CHANGE UP
TROPONIN T SERPL-MCNC: <0.01 NG/ML — SIGNIFICANT CHANGE UP (ref 0–0.06)

## 2023-08-11 PROCEDURE — 12345: CPT | Mod: NC

## 2023-08-11 PROCEDURE — 93005 ELECTROCARDIOGRAM TRACING: CPT

## 2023-08-11 PROCEDURE — 99222 1ST HOSP IP/OBS MODERATE 55: CPT

## 2023-08-11 PROCEDURE — 99223 1ST HOSP IP/OBS HIGH 75: CPT

## 2023-08-11 PROCEDURE — 96374 THER/PROPH/DIAG INJ IV PUSH: CPT

## 2023-08-11 PROCEDURE — 99285 EMERGENCY DEPT VISIT HI MDM: CPT

## 2023-08-11 PROCEDURE — 70450 CT HEAD/BRAIN W/O DYE: CPT | Mod: MA

## 2023-08-11 PROCEDURE — 36415 COLL VENOUS BLD VENIPUNCTURE: CPT

## 2023-08-11 PROCEDURE — 85025 COMPLETE CBC W/AUTO DIFF WBC: CPT

## 2023-08-11 PROCEDURE — 96375 TX/PRO/DX INJ NEW DRUG ADDON: CPT

## 2023-08-11 PROCEDURE — 84484 ASSAY OF TROPONIN QUANT: CPT

## 2023-08-11 PROCEDURE — 81001 URINALYSIS AUTO W/SCOPE: CPT

## 2023-08-11 PROCEDURE — 80053 COMPREHEN METABOLIC PANEL: CPT

## 2023-08-11 PROCEDURE — 70450 CT HEAD/BRAIN W/O DYE: CPT | Mod: 26,MA

## 2023-08-11 PROCEDURE — 80061 LIPID PANEL: CPT

## 2023-08-11 PROCEDURE — 80048 BASIC METABOLIC PNL TOTAL CA: CPT

## 2023-08-11 RX ORDER — FINASTERIDE 5 MG/1
5 TABLET, FILM COATED ORAL DAILY
Refills: 0 | Status: DISCONTINUED | OUTPATIENT
Start: 2023-08-11 | End: 2023-08-11

## 2023-08-11 RX ORDER — CLOPIDOGREL BISULFATE 75 MG/1
75 TABLET, FILM COATED ORAL DAILY
Refills: 0 | Status: DISCONTINUED | OUTPATIENT
Start: 2023-08-11 | End: 2023-08-11

## 2023-08-11 RX ORDER — LABETALOL HCL 100 MG
10 TABLET ORAL EVERY 4 HOURS
Refills: 0 | Status: DISCONTINUED | OUTPATIENT
Start: 2023-08-11 | End: 2023-08-11

## 2023-08-11 RX ORDER — CARVEDILOL PHOSPHATE 80 MG/1
1 CAPSULE, EXTENDED RELEASE ORAL
Qty: 60 | Refills: 0
Start: 2023-08-11 | End: 2023-09-09

## 2023-08-11 RX ORDER — METOPROLOL TARTRATE 50 MG
25 TABLET ORAL
Refills: 0 | Status: DISCONTINUED | OUTPATIENT
Start: 2023-08-11 | End: 2023-08-11

## 2023-08-11 RX ORDER — ATORVASTATIN CALCIUM 80 MG/1
40 TABLET, FILM COATED ORAL AT BEDTIME
Refills: 0 | Status: DISCONTINUED | OUTPATIENT
Start: 2023-08-11 | End: 2023-08-11

## 2023-08-11 RX ORDER — ONDANSETRON 8 MG/1
4 TABLET, FILM COATED ORAL EVERY 8 HOURS
Refills: 0 | Status: DISCONTINUED | OUTPATIENT
Start: 2023-08-11 | End: 2023-08-11

## 2023-08-11 RX ORDER — PANTOPRAZOLE SODIUM 20 MG/1
40 TABLET, DELAYED RELEASE ORAL
Refills: 0 | Status: DISCONTINUED | OUTPATIENT
Start: 2023-08-11 | End: 2023-08-11

## 2023-08-11 RX ORDER — ACETAMINOPHEN 500 MG
650 TABLET ORAL EVERY 6 HOURS
Refills: 0 | Status: DISCONTINUED | OUTPATIENT
Start: 2023-08-11 | End: 2023-08-11

## 2023-08-11 RX ORDER — ZOLPIDEM TARTRATE 10 MG/1
5 TABLET ORAL AT BEDTIME
Refills: 0 | Status: DISCONTINUED | OUTPATIENT
Start: 2023-08-11 | End: 2023-08-11

## 2023-08-11 RX ORDER — AMLODIPINE BESYLATE 2.5 MG/1
10 TABLET ORAL DAILY
Refills: 0 | Status: DISCONTINUED | OUTPATIENT
Start: 2023-08-11 | End: 2023-08-11

## 2023-08-11 RX ORDER — ASPIRIN/CALCIUM CARB/MAGNESIUM 324 MG
81 TABLET ORAL DAILY
Refills: 0 | Status: DISCONTINUED | OUTPATIENT
Start: 2023-08-11 | End: 2023-08-11

## 2023-08-11 RX ORDER — LANOLIN ALCOHOL/MO/W.PET/CERES
3 CREAM (GRAM) TOPICAL AT BEDTIME
Refills: 0 | Status: DISCONTINUED | OUTPATIENT
Start: 2023-08-11 | End: 2023-08-11

## 2023-08-11 RX ORDER — CARVEDILOL PHOSPHATE 80 MG/1
3.12 CAPSULE, EXTENDED RELEASE ORAL ONCE
Refills: 0 | Status: COMPLETED | OUTPATIENT
Start: 2023-08-11 | End: 2023-08-11

## 2023-08-11 RX ORDER — AMLODIPINE BESYLATE 2.5 MG/1
1 TABLET ORAL
Qty: 30 | Refills: 0
Start: 2023-08-11 | End: 2023-09-09

## 2023-08-11 RX ORDER — CARVEDILOL PHOSPHATE 80 MG/1
3.12 CAPSULE, EXTENDED RELEASE ORAL EVERY 12 HOURS
Refills: 0 | Status: DISCONTINUED | OUTPATIENT
Start: 2023-08-11 | End: 2023-08-11

## 2023-08-11 RX ADMIN — CARVEDILOL PHOSPHATE 3.12 MILLIGRAM(S): 80 CAPSULE, EXTENDED RELEASE ORAL at 10:54

## 2023-08-11 RX ADMIN — AMLODIPINE BESYLATE 10 MILLIGRAM(S): 2.5 TABLET ORAL at 06:12

## 2023-08-11 RX ADMIN — Medication 81 MILLIGRAM(S): at 10:42

## 2023-08-11 RX ADMIN — Medication 25 MILLIGRAM(S): at 06:29

## 2023-08-11 RX ADMIN — Medication 0.1 MILLIGRAM(S): at 01:39

## 2023-08-11 RX ADMIN — FINASTERIDE 5 MILLIGRAM(S): 5 TABLET, FILM COATED ORAL at 10:42

## 2023-08-11 RX ADMIN — ONDANSETRON 4 MILLIGRAM(S): 8 TABLET, FILM COATED ORAL at 05:00

## 2023-08-11 RX ADMIN — PANTOPRAZOLE SODIUM 40 MILLIGRAM(S): 20 TABLET, DELAYED RELEASE ORAL at 06:12

## 2023-08-11 RX ADMIN — Medication 650 MILLIGRAM(S): at 05:00

## 2023-08-11 RX ADMIN — Medication 75 MILLIGRAM(S): at 06:12

## 2023-08-11 RX ADMIN — CLOPIDOGREL BISULFATE 75 MILLIGRAM(S): 75 TABLET, FILM COATED ORAL at 10:42

## 2023-08-11 NOTE — CONSULT NOTE ADULT - SUBJECTIVE AND OBJECTIVE BOX
[facility]                                              CARDIOLOGY AT [place] [address]                                             Telephone: [phone]. Fax:[phone]                                                       CARDIOLOGY CONSULTATION NOTE                                                                                             History obtained by: Patient and medical record  Community Cardiologist: [facility]   obtained: Yes [  ] No [  ]  Reason for Consultation: chest pain  Available out pt records reviewed: Yes [x] No [  ]    Chief complaint:  chest pain and nausea    HPI: Patient is an 82yo M w/ PMHx of HTN, HLD, neuropathy, NSTEMi w/ DESx 2 to Prox RCA and mLAD on 02/2023 ASA/Plavix, GERD, BPH presents to [facility] EDU c/o nausea for last 2 days and lightheadedness/dizziness. Also with episode of chest pressure earlier yesterday, dull 4/10 in severity, not associated with any other symptoms and resolved after a few minutes.  Denies HA, palpitations, fever, chills,  shortness of breath, cough, vomiting, diarrhea, hematuria, dysuria, dark stools, focal neurologic symptoms.    CARDIAC TESTING   ECHO: Summary:   1. Left ventricular ejection fraction, by visual estimation, is 55 to 60%.   2. Normal global left ventricular systolic function.   3. LV Ejection Fraction by Khalil's Method with a biplane EF of 58 %.   4. There is moderate asymmetric left ventricular hypertrophy.   5. Mildly enlarged right ventricle.   6. Mild mitral annular calcification.   7. Mild thickening of the anterior and posterior mitral valve leaflets.   8. Moderate mitral valve regurgitation.   9. Mild to moderate pulmonic valve regurgitation.  10. There is no evidence of pericardial effusion.  11. There are no prior studies on this patient for comparison purposes.    Radha Gamboa MD Electronically signed on 1/20/2023     STRESS:    CATH: Procedures Performed   Procedures:    1.    Ultrasound Guided Access   2.    Arterial Access - Left Femoral   3.    IVUS   4.    PCI: STANLEY   5.    Diagnostic Coronary Angiography   6.    Left Heart Cath   7.    iFR/dFR/rFR   8.    IVUS   9.    PCI: STANLEY   10.   AngioSeal     Indications:               staged pci after nstemi     Conclusions:   1. Successful staged IVUS-guided PCI of proximal RCA and mid LAD (iFR significant).  Prox RCA 3.5x38 post dil 4.5 mm Mid LAD 2.75x33 post dil 4.0    2. Mildly elevated LVEDP.     Recommendations:   1. Monitor overnight on telemetry. DAPT, statin. cardiac rehab as outpatient. close office follow up.    Acute complication:    No complications     Hemodynamic:           Hemodynamic Impressions   General Impressions: Hemodynamic assessment demonstrates mildly elevated LVEDP.    Presentation:   History of nstemi, pci OM 1/2023, HTN, HLD here for complete revascularization staged pci.    ELECTROPHYSIOLOGY:     PAST MEDICAL HISTORY  Kidney stones  Gastritis  CAD (coronary artery disease)  NSTEMI (non-ST elevation myocardial infarction)  Hyperlipidemia  Mitral regurgitation    PAST SURGICAL HISTORY  Total knee replacement status, right  S/P total knee replacement, left  S/P coronary artery stent placement    SOCIAL HISTORY:  Denies smoking/alcohol/drugs    FAMILY HISTORY: No pertinent family history in first degree relatives    Family History of Cardiovascular Disease:  Yes [  ] No [  ]  Coronary Artery Disease in first degree relative: Yes [  ] No [  ]  Sudden Cardiac Death in First degree relative: Yes [  ] No [  ]    HOME MEDICATIONS:  alfuzosin 10 mg oral tablet, extended release: 1 tab(s) orally once a day (09 Feb 2023 08:53)  finasteride 5 mg oral tablet: 1 tab(s) orally once a day (09 Feb 2023 08:53)  losartan 25 mg oral tablet: 0.5 tab(s) orally once a day (09 Feb 2023 20:46)  pantoprazole 40 mg oral delayed release tablet: 1 tab(s) orally once a day (09 Feb 2023 20:46)  pregabalin 75 mg oral capsule: 1 cap(s) orally 2 times a day (09 Feb 2023 08:53)  zolpidem 10 mg oral tablet: 1 tab(s) orally once a day (at bedtime) (09 Feb 2023 08:53)    ALLERGIES: No Known Allergies    REVIEW OF SYMPTOMS:   CONSTITUTIONAL: No fever, no chills, no fatigue   ENMT:  No vertigo; No sinus or throat pain  NECK: No pain or stiffness  CARDIOVASCULAR: + chest pain, no dyspnea, no syncope/presyncope, no palpitations, no dizziness, no Orthopnea, no Paroxsymal nocturnal dyspnea  RESPIRATORY: no Shortness of breath, no cough, no wheezing  : No dysuria, no hematuria   GI: No dark color stool, no nausea, no diarrhea, no constipation, no abdominal pain   NEURO: No headache, + lightheaded, no slurred speech   MUSCULOSKELETAL: No joint pain or swelling; No muscle, back, or extremity pain  PSYCH: No agitation, no anxiety    ALL OTHER REVIEW OF SYSTEMS ARE NEGATIVE.    VITAL SIGNS:  T(C): 36.8 (08-11-23 @ 00:41), Max: 36.8 (08-10-23 @ 16:00)  T(F): 98.2 (08-11-23 @ 00:41), Max: 98.2 (08-10-23 @ 16:00)  HR: 67 (08-11-23 @ 01:51) (65 - 97)  BP: 185/91 (08-11-23 @ 01:51) (178/86 - 202/95)  RR: 18 (08-11-23 @ 00:41) (18 - 20)  SpO2: 95% (08-11-23 @ 00:41) (95% - 97%)    INTAKE AND OUTPUT:     PHYSICAL EXAM:  Constitutional: Comfortable, no acute distress   HEENT: Atraumatic, neck is supple, no JVD  CNS: A&Ox3. No focal deficits   Respiratory: CTAB, unlabored   Cardiovascular: RRR normal S1S2, no murmur or rubs  Gastrointestinal: Soft, non-tender +Bowel sounds   Extremities: 2+ Peripheral Pulses, no cyanosis, or edema  Psychiatric: Calm, no agitation   Skin: Warm and dry, no ulcers on extremities     LABS:  ( 11 Aug 2023 01:45 )  Troponin T  <0.01,  CPK  X    , CKMB  X    , BNP X        , ( 10 Aug 2023 16:45 )  Troponin T  <0.01,  CPK  X    , CKMB  X    , BNP X                           13.2   8.64  )-----------( 306      ( 10 Aug 2023 16:45 )             38.8     08-10    136  |  103  |  23.5<H>  ----------------------------<  99  3.9   |  20.0<L>  |  1.77<H>    Ca    8.5      10 Aug 2023 21:53    TPro  6.3<L>  /  Alb  3.6  /  TBili  0.8  /  DBili  x   /  AST  22  /  ALT  20  /  AlkPhos  168<H>  08-10      Urinalysis Basic - ( 10 Aug 2023 21:53 )  Color: x / Appearance: x / SG: x / pH: x  Gluc: 99 mg/dL / Ketone: x  / Bili: x / Urobili: x   Blood: x / Protein: x / Nitrite: x   Leuk Esterase: x / RBC: x / WBC x   Sq Epi: x / Non Sq Epi: x / Bacteria: x    INTERPRETATION OF TELEMETRY: Sinus no ectopy  ECG: NSR@92bpm, LAD, Qs in III, aVF (old)   Prior ECG: Yes [x] No [  ]    RADIOLOGY & ADDITIONAL STUDIES:   X-ray:  Pen  CT scan: IMPRESSION:  No acute intracranial abnormality.  Mild brain volume loss.  White matter changes that likely represent microvascular disease.

## 2023-08-11 NOTE — DISCHARGE NOTE PROVIDER - NSDCCPCAREPLAN_GEN_ALL_CORE_FT
PRINCIPAL DISCHARGE DIAGNOSIS  Diagnosis: Hypertensive emergency  Assessment and Plan of Treatment: - Improved  - Please take medications as prescribed  - Follow up with Dr. Ward in 1-2 weeks for outpatient TTE and possible stress test

## 2023-08-11 NOTE — DISCHARGE NOTE PROVIDER - ATTENDING ATTESTATION STATEMENT
I have personally seen and examined the patient. I have collaborated with and supervised the
set-up required/1 person assist/verbal cues

## 2023-08-11 NOTE — DISCHARGE NOTE PROVIDER - NSDCMRMEDTOKEN_GEN_ALL_CORE_FT
alfuzosin 10 mg oral tablet, extended release: 1 tab(s) orally once a day  amLODIPine 10 mg oral tablet: 1 tab(s) orally once a day  aspirin 81 mg oral tablet, chewable: 1 tab(s) orally once a day  atorvastatin 40 mg oral tablet: 1 tab(s) orally once a day (at bedtime)  carvedilol 3.125 mg oral tablet: 1 tab(s) orally every 12 hours  clopidogrel 75 mg oral tablet: 1 tab(s) orally once a day  finasteride 5 mg oral tablet: 1 tab(s) orally once a day  pantoprazole 40 mg oral delayed release tablet: 1 tab(s) orally once a day  pregabalin 75 mg oral capsule: 1 cap(s) orally 2 times a day  zolpidem 10 mg oral tablet: 1 tab(s) orally once a day (at bedtime)

## 2023-08-11 NOTE — PATIENT PROFILE ADULT - FALL HARM RISK - UNIVERSAL INTERVENTIONS
Bed in lowest position, wheels locked, appropriate side rails in place/Call bell, personal items and telephone in reach/Instruct patient to call for assistance before getting out of bed or chair/Non-slip footwear when patient is out of bed/[place] to call system/Physically safe environment - no spills, clutter or unnecessary equipment/Purposeful Proactive Rounding/Room/bathroom lighting operational, light cord in reach

## 2023-08-11 NOTE — DISCHARGE NOTE PROVIDER - HOSPITAL COURSE
81 y/o M with PMH of NSTEMI, CAD s/p PCI, HTN, HLD, presented with complaints of elevated blood pressure and chest pain. The patient was admitted for hypertensive emergency and treated with Clonidine and Labetalol. Cardiology was consulted. The patient is hemodynamically stable for discharge with appropriate follow up.

## 2023-08-11 NOTE — CONSULT NOTE ADULT - PROBLEM SELECTOR RECOMMENDATION 9
Monitor on Tele overnight for acute arrhythmias, check labs including CBC, BMP, trend CE x3,   - FLP and A1C in AM, check ECG and CXR tonight  - continue home ASA, Plavix, BB and Atorvastatin and low cholesterol diet, monitor lFts  - pain management as needed (NTG/MSO4)  - will follow in AM Monitor on Tele overnight for acute arrhythmias, check labs including CBC, BMP, trend CE x3,   - FLP and A1C in AM, check ECG and CXR tonight  - continue home ASA, Plavix, Coreg and Atorvastatin and low cholesterol diet, monitor lFts  - pain management as needed (NTG/MSO4)  - will follow in AM

## 2023-08-11 NOTE — CHART NOTE - NSCHARTNOTEFT_GEN_A_CORE
Patient seen and examined at bedside. No acute overnight events reported. BP now improved. Cardiology recommendations appreciated. Continue current plan of care.

## 2023-08-11 NOTE — DISCHARGE NOTE NURSING/CASE MANAGEMENT/SOCIAL WORK - PATIENT PORTAL LINK FT
You can access the FollowMyHealth Patient Portal offered by [facility] by registering at the following website: [email or web address]. By joining [facility]’s FollowMyHealth portal, you will also be able to view your health information using other applications (apps) compatible with our system.

## 2023-08-11 NOTE — CONSULT NOTE ADULT - NS ATTEND AMEND GEN_ALL_CORE FT
Patient was seen and examined at bedside and notes to be doing well with no recurrence of chest pain or shortness of breath   Presented with hypertensive emergency and SERA   Trops negative x2     84yo M w/ PMHx of HTN, HLD, neuropathy, NSTEMi w/ DESx 2 to Prox RCA and mLAD on 02/2023 ASA/Plavix, GERD, BPH presents to [facility] EDU c/o nausea for last 2 days and lightheadedness/dizziness, secondary to hypertensive emergency   - patient notes no recurrence of chest pain or shortness of breath   - EKG reviewed unchanged form prior NSR with LVH   - trops neg x 2   - will recommend to stop the losartan due to elevated Cr and will start Coreg 3.125mg po q 12hrs and continue with Norvasc 10mg po q daily   - no further cardiac work up, last TTE done was in Jan 2023 and spoke to patients private cardiologist and will see in the office in 2 weeks and will arrange for outpatient stress test and TTE   - patient appears euvolemic and discussed with patient will want interval improvement in BP prior to discharge home   - continue with DAPT (ASA and Plavix)   - will need to follow up with Nephrologist in the outpatient due to SERA, in the office as per prvt cardiologist Cr 2.0     Follows with DR. Ward in the [place] office     Radha Gamboa D.O. [facility]  Cardiology/Vascular Cardiology -[facility]   Telephone # [phone]

## 2023-08-11 NOTE — CONSULT NOTE ADULT - ASSESSMENT
82yo M w/ HTN, HLD, CAD w/ STANLEY x2, NSTEMi 02/2023 on ASA/Plavix, presents to [facility] EDU c/o nausea for 2 days, lightheadedness/dizziness, and episode of chest pressure, resolved after a few minutes.  CEx2 negative   ECG: SR no acute ischemia   Head CT: no acute bleed

## 2023-08-11 NOTE — DISCHARGE NOTE PROVIDER - ATTENDING DISCHARGE PHYSICAL EXAMINATION:
Vital Signs Last 24 Hrs  T(F): 97.4 (11 Aug 2023 11:12), Max: 98.2 (10 Aug 2023 16:00)  HR: 71 (11 Aug 2023 11:12) (58 - 97)  BP: 146/76 (11 Aug 2023 11:12) (146/76 - 202/95)  RR: 18 (11 Aug 2023 11:12) (18 - 189)  SpO2: 95% (11 Aug 2023 11:12) (94% - 99%)    Physical Exam:  Constitutional: alert and oriented, in no acute distress   Neck: Soft and supple, No LAD, No JVD  Respiratory: Clear to auscultation bilaterally, no wheezes or crackles  Cardiovascular: Regular rate and rhythm, no murmurs, gallops, rubs  Gastrointestinal: Soft, non-tender to palpation, +bs  Vascular: 2+ peripheral pulses  Neurological: A/O x 3, no focal neurological deficits  Musculoskeletal: 5/5 strength b/l upper and lower extremities, no lower extremity edema bilaterally

## 2023-08-11 NOTE — DISCHARGE NOTE PROVIDER - NSDCFUSCHEDAPPT_GEN_ALL_CORE_FT
[facility]  CARDIOLOGY [address]  Scheduled Appointment: 09/27/2023    [facility]  CARDIOLOGY [address]  Scheduled Appointment: 09/27/2023    Khanh Ward  [facility]  CARDIOLOGY [address]  Scheduled Appointment: 10/11/2023

## 2023-08-11 NOTE — CONSULT NOTE ADULT - PROBLEM SELECTOR RECOMMENDATION 3
Assess for target organ damage (SERA, CKD, encephalopathy) BP goal<130/80mmHg  - lifestyle modifications (DASH diet, daily exercise encouraged, weight loss, limit ETOH intake, avoid NSAIDs)   - VS as per unit protocol  - pharmacologic options: thiazide (Chlorthalidone) with normal GFR, (Loops w/ GFR<30ml/min), ACEi/ARBs, CCBs, if still not at goal start BB    case d/w Dr. Gamboa Assess for target organ damage (SERA, CKD, encephalopathy) BP goal<130/80mmHg  - lifestyle modifications (DASH diet, daily exercise encouraged, weight loss, limit ETOH intake, avoid NSAIDs)   - VS as per unit protocol  - pharmacologic options: will start Coreg 3.125mg oral 2 x daily for optimal BP goal, hold Losartan w/ SERA     case d/w Dr. Gamboa

## 2023-08-11 NOTE — DISCHARGE NOTE NURSING/CASE MANAGEMENT/SOCIAL WORK - NSDCPEFALRISK_GEN_ALL_CORE
For information on Fall & Injury Prevention, visit: [email or web address] OR  [email or web address] OR  https://www.cdc.gov/steadi/patient.html

## 2023-08-11 NOTE — H&P ADULT - ASSESSMENT
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 Ward: STANLEY x 1 to pRCA (XIENCE SKYPOINT 3.5x38mm) and STANLEY x 1 to mLAD on 2/2023, HTN, HLD with complaints of elevated blood pressure and chest pain. Given elevated BP and SERA with nausea, pt admitted for hypertensive emergency.      #HTN emergency  #SERA  s/p labetalol 20 mg IV and clonidine in ED   start amlodipine 10 mg daily  hold losartan given SERA  pt received 1 L NS bolus in the ED, hold off on fluids for now. Suspect SERA related to HTN  goal systolic BP around 160 today and can further decreased after 24 hours  telemetry monitoring  check TSH  recheck BMP    #Hx of NSTEMI   #Hx of HLD  trop x 2 negative  EKG with no ST-T wave changes  c/w aspirin, clopidogrel  c/w atorvstatin    #BPH   c/w finasteride

## 2023-08-11 NOTE — H&P ADULT - HISTORY OF PRESENT ILLNESS
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 Ward: STANLEY x 1 to pRCA (XIENCE SKYPOINT 3.5x38mm) and STANLEY x 1 to mLAD on 2/2023 with complaints of elevated blood pressure and chest pain. Pt noted chest pressure and measured his BP at home which was systolic of 185. States he took his losartan today. Pt also complained of accompanying chest pressure localized to mid sternum, no radiation. No SOB, no abdominal pain. However, did endorse nausea which relieved when he received zofran. No recent sick contacts, no exertional dyspnea.    Walks without walker or cane, no ETOH, no smoking no illicit drugs.    In hte ED, BP with systolic over 200, improved to 185 after adminiration of medication. 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 Ward: STANLEY x 1 to pRCA (XIENCE SKYPOINT 3.5x38mm) and STANLEY x 1 to mLAD on 2/2023 with complaints of elevated blood pressure and chest pain. Pt noted chest pressure and measured his BP at home which was systolic of 185. States he took his losartan today. Pt also complained of accompanying chest pressure localized to mid sternum, no radiation. No SOB, no abdominal pain. However, did endorse nausea which relieved when he received zofran. No recent sick contacts, no exertional dyspnea.    Walks without walker or cane, no ETOH, no smoking no illicit drugs.    In hte ED, BP with systolic over 200, improved to 185 after adminiration of medication. Chest pain also resolved. labs with Co2 20, BUN/Cr 23.5/1.77 (bl 1.19). Trop negative x 2. EKG NSR no ST-T wave changes QTc 420 and HR 92. CTH negative for acute changes. Received clonidine, labetalol IV, zofran and NS 1 L

## 2023-08-30 ENCOUNTER — APPOINTMENT (OUTPATIENT)
Dept: CARDIOLOGY | Facility: CLINIC | Age: 83
End: 2023-08-30
Payer: MEDICARE

## 2023-08-30 VITALS
HEIGHT: 70 IN | BODY MASS INDEX: 25.2 KG/M2 | OXYGEN SATURATION: 96 % | DIASTOLIC BLOOD PRESSURE: 70 MMHG | SYSTOLIC BLOOD PRESSURE: 130 MMHG | WEIGHT: 176 LBS | HEART RATE: 83 BPM

## 2023-08-30 PROBLEM — I21.4 NON-ST ELEVATION (NSTEMI) MYOCARDIAL INFARCTION: Chronic | Status: ACTIVE | Noted: 2023-02-08

## 2023-08-30 PROCEDURE — 76376 3D RENDER W/INTRP POSTPROCES: CPT

## 2023-08-30 PROCEDURE — 93306 TTE W/DOPPLER COMPLETE: CPT

## 2023-08-30 PROCEDURE — 78452 HT MUSCLE IMAGE SPECT MULT: CPT

## 2023-08-30 PROCEDURE — 99215 OFFICE O/P EST HI 40 MIN: CPT | Mod: 25

## 2023-08-30 PROCEDURE — 93015 CV STRESS TEST SUPVJ I&R: CPT

## 2023-08-30 PROCEDURE — A9502: CPT

## 2023-08-30 RX ORDER — LOSARTAN POTASSIUM 25 MG/1
25 TABLET, FILM COATED ORAL
Qty: 90 | Refills: 3 | Status: DISCONTINUED | COMMUNITY
Start: 1900-01-01 | End: 2023-08-30

## 2023-08-30 NOTE — CARDIOLOGY SUMMARY
[de-identified] : NSR  [de-identified] : 1/2023: EF 55%, moderate MR [de-identified] : 2/2023 cath: RCA and LAD WILLY. \par  1/2023: OM1 willy. prox RCA 70% and mid LAD 60-70%.

## 2023-08-30 NOTE — HISTORY OF PRESENT ILLNESS
[FreeTextEntry1] : 84 yo M NSTEMI with MVCAD s/p PCI OM1, LAD, RCA 1/2023 Moderate mitral regurgitation Gastritis  8/30/23: went to [facility] in interim with chest pain/hypertension was treated with clonidine and labetalol. switched to ccb/bb instead of arb. no acs. cr improved actually to 1.82. echocardiogram today with no significant change. nuclear stress test had lateral wall ischemia; he had poor METS no ekg ischemia but he reports stopped because test was completed not because of symptom limitation.  reports sbp 130s home.  he reports being diagnosed with a UTI and completed antibiotics. he said symptoms were more epigastric pain. has dyspnea unchanged.   8/2023: Has worsening creatinine unclear etiology.  Dental work in interim.  Came in in interim with high blood pressure. has orthostatic sx intermittently. no exertional symptoms. random atypical left chest pain.   5/2023: feels well overall without chest pain. no recurrence of that. intermittent dyspnea unfortunately but no alarm signs/symptoms. blood pressure elevated on home readings too after lowering pharmacologic therapy last visit. Echocardiogram stable overall. aorta 4.3 cm mod MR.    2/2023 VISIT: Underwent staged PCI of RCA and LAD.  Left CFA access site well-healing.  Dyspnea resolved.  On Plavix and aspirin.  Doing well.  Compliant.  No bleeding.  Pending cardiac rehab.  1/2023 INITIAL VISIT: here after nstemi from [facility]. rcfa and radial access sites well healing. does report atypical dyspnea consistent with brilinta side effect. no angina. ekg today without issue. does report fatigue and hypotension when on bb/arb. he was not on cv medications prior.   TESTING I REVIEWED TODAY: Excluding above

## 2023-08-30 NOTE — DISCUSSION/SUMMARY
[FreeTextEntry1] :  # NSTEMI with MVCAD s/p complete revascularization, moderate mitral regurg: EF preserved. - has lateral wall ischemia; we will uptitrate BB/ccb therapy and see back in 1 month after he sees nephrology. we discussed potential need for coronary angiography - Continue aspirin/Plavix. Prefer long-term.  Statin.  Serial lab work. - bb/ccb. hydrate. Lower dose antihypertensives given hypotension. at 82 years old, his risk of hypotension is extremely high and can lead to fall - cardiac rehab [facility]  #CKD: unclear etiology - nephrology, urology, hydrate. consider prostate.   # Gastritis: had switched to protonix (omeprazole can decrease efficacy of plavix).  Follow in 1 month. he will let me know if any issue. ER precautions given to patient.

## 2023-09-01 NOTE — CARDIOLOGY SUMMARY
[de-identified] : NSR  [de-identified] : 1/2023: EF 55%, moderate MR [de-identified] : 2/2023 cath: RCA and LAD WILLY. \par  1/2023: OM1 willy. prox RCA 70% and mid LAD 60-70%.

## 2023-09-01 NOTE — HISTORY OF PRESENT ILLNESS
[FreeTextEntry1] : 84 yo M NSTEMI with MVCAD s/p PCI OM1, LAD, RCA 1/2023 Moderate mitral regurgitation Gastritis  8/30/23: went to [facility] in interim with chest pain/hypertension was treated with clonidine and labetalol. switched to ccb/bb instead of arb. no acs. cr improved actually to 1.82. echocardiogram today with no significant change. nuclear stress test had lateral wall ischemia; he had poor METS no ekg ischemia but he reports stopped because test was completed not because of symptom limitation.  reports sbp 130s home.  he reports being diagnosed with a UTI and completed antibiotics. he said symptoms were more epigastric pain. has dyspnea unchanged.   8/2023: Has worsening creatinine unclear etiology.  Dental work in interim.  Came in in interim with high blood pressure. has orthostatic sx intermittently. no exertional symptoms. random atypical left chest pain.   5/2023: feels well overall without chest pain. no recurrence of that. intermittent dyspnea unfortunately but no alarm signs/symptoms. blood pressure elevated on home readings too after lowering pharmacologic therapy last visit. Echocardiogram stable overall. aorta 4.3 cm mod MR.    2/2023 VISIT: Underwent staged PCI of RCA and LAD.  Left CFA access site well-healing.  Dyspnea resolved.  On Plavix and aspirin.  Doing well.  Compliant.  No bleeding.  Pending cardiac rehab.  1/2023 INITIAL VISIT: here after nstemi from [facility]. rcfa and radial access sites well healing. does report atypical dyspnea consistent with brilinta side effect. no angina. ekg today without issue. does report fatigue and hypotension when on bb/arb. he was not on cv medications prior.   TESTING I REVIEWED TODAY: Excluding above

## 2023-09-01 NOTE — DISCUSSION/SUMMARY
[FreeTextEntry1] :  # NSTEMI with MVCAD s/p complete revascularization, moderate mitral regurg: EF preserved. - has lateral wall ischemia; we will uptitrate BB/ccb therapy and see back in 1 month after he sees nephrology. we discussed potential need for coronary angiography - Continue aspirin/Plavix. Prefer long-term.  Statin.  Serial lab work. - bb/ccb. hydrate. Lower dose antihypertensives given hypotension. at 82 years old, his risk of hypotension is extremely high and can lead to fall - cardiac rehab [facility]  #CKD: unclear etiology - nephrology, urology, hydrate. consider prostate.   # Gastritis: had switched to protonix (omeprazole can decrease efficacy of plavix).  Follow in 1 month. he will let me know if any issue. ER precautions given to patient.

## 2023-09-07 ENCOUNTER — TRANSCRIPTION ENCOUNTER (OUTPATIENT)
Age: 83
End: 2023-09-07

## 2023-09-27 ENCOUNTER — APPOINTMENT (OUTPATIENT)
Dept: CARDIOLOGY | Facility: CLINIC | Age: 83
End: 2023-09-27
Payer: MEDICARE

## 2023-09-27 VITALS
DIASTOLIC BLOOD PRESSURE: 64 MMHG | BODY MASS INDEX: 25.05 KG/M2 | HEIGHT: 70 IN | HEART RATE: 71 BPM | SYSTOLIC BLOOD PRESSURE: 110 MMHG | WEIGHT: 175 LBS | OXYGEN SATURATION: 98 %

## 2023-09-27 PROCEDURE — 99215 OFFICE O/P EST HI 40 MIN: CPT

## 2023-10-04 ENCOUNTER — NON-APPOINTMENT (OUTPATIENT)
Age: 83
End: 2023-10-04

## 2023-10-07 ENCOUNTER — NON-APPOINTMENT (OUTPATIENT)
Age: 83
End: 2023-10-07

## 2024-01-24 ENCOUNTER — APPOINTMENT (OUTPATIENT)
Dept: CARDIOLOGY | Facility: CLINIC | Age: 84
End: 2024-01-24
Payer: MEDICARE

## 2024-01-24 ENCOUNTER — NON-APPOINTMENT (OUTPATIENT)
Age: 84
End: 2024-01-24

## 2024-01-24 VITALS
OXYGEN SATURATION: 96 % | HEART RATE: 63 BPM | DIASTOLIC BLOOD PRESSURE: 64 MMHG | SYSTOLIC BLOOD PRESSURE: 122 MMHG | HEIGHT: 70 IN

## 2024-01-24 PROCEDURE — 99214 OFFICE O/P EST MOD 30 MIN: CPT

## 2024-01-24 RX ORDER — HYDRALAZINE HYDROCHLORIDE 25 MG/1
25 TABLET ORAL TWICE DAILY
Qty: 180 | Refills: 1 | Status: ACTIVE | COMMUNITY
Start: 2024-01-24 | End: 1900-01-01

## 2024-01-24 NOTE — CARDIOLOGY SUMMARY
[de-identified] : NSR  [de-identified] : 1/2023: EF 55%, moderate MR [de-identified] : 2/2023 cath: RCA and LAD WILLY. \par  1/2023: OM1 willy. prox RCA 70% and mid LAD 60-70%.

## 2024-01-24 NOTE — REASON FOR VISIT
[Symptom and Test Evaluation] : symptom and test evaluation [CV Risk Factors and Non-Cardiac Disease] : CV risk factors and non-cardiac disease [Coronary Artery Disease] : coronary artery disease [FreeTextEntry3] : Dr. Arash Larson

## 2024-01-24 NOTE — HISTORY OF PRESENT ILLNESS
[FreeTextEntry1] : 84 yo M NSTEMI with MVCAD s/p PCI OM1, LAD, RCA 1/2023 Moderate mitral regurgitation Gastritis preDM2  1/2024 VISIT: feels well. no exertional symptoms. cr 1.84. A1c 5.8.  Optimized CMP.   9/2023 VISIT: hypertension controlled and symptoms improved on utptitrated med therapy. saw renal, creatinine stable 1.82. feels well on exertion. could not do cardiac rehab.   8/30/23: went to [facility] in interim with chest pain/hypertension was treated with clonidine and labetalol. switched to ccb/bb instead of arb. no acs. cr improved actually to 1.82. echocardiogram today with no significant change. nuclear stress test had lateral wall ischemia; he had poor METS no ekg ischemia but he reports stopped because test was completed not because of symptom limitation.  he reports being diagnosed with a UTI and completed antibiotics. he said symptoms were more epigastric pain. has dyspnea unchanged.   8/2023: Has worsening creatinine unclear etiology.  Dental work in interim.  Came in in interim with high blood pressure. has orthostatic sx intermittently. no exertional symptoms. random atypical left chest pain.   5/2023: feels well overall without chest pain. no recurrence of that. intermittent dyspnea unfortunately but no alarm signs/symptoms. blood pressure elevated on home readings too after lowering pharmacologic therapy last visit. Echocardiogram stable overall. aorta 4.3 cm mod MR.    2/2023 VISIT: Underwent staged PCI of RCA and LAD.  Left CFA access site well-healing.  Dyspnea resolved.  On Plavix and aspirin.  Doing well.  Compliant.  No bleeding.  Pending cardiac rehab.  1/2023 INITIAL VISIT: here after nstemi from [facility]. rcfa and radial access sites well healing. does report atypical dyspnea consistent with brilinta side effect. no angina. ekg today without issue. does report fatigue and hypotension when on bb/arb. he was not on cv medications prior.  TESTING I REVIEWED TODAY: Excluding above

## 2024-01-24 NOTE — DISCUSSION/SUMMARY
[FreeTextEntry1] : # NSTEMI with MVCAD s/p complete revascularization, moderate mitral regurgitation: EF preserved. - has lateral wall ischemia and we discussed potential need for coronary angiography if he has symptoms - switch beta blocker due to erectile dysfunction to hydralazine 25 BID (avoid nitrate given ED and any nephrotoxic agents).    - Continue aspirin/Plavix. Prefer long-term. Statin. Serial lab work. - hydrate. at 82+ years old, his risk of hypotension is extremely high and can lead to a fall - cardiac rehab not approved   With his moderate valve disease, check echo in 6 months  #CKD: unclear etiology - seeing nephrology in [facility]  - urology, hydrate. consider prostate issue   Follow in 6 months with echo. he will let me know if any issue. ER precautions given to patient.

## 2024-02-09 ENCOUNTER — APPOINTMENT (OUTPATIENT)
Dept: CARDIOLOGY | Facility: CLINIC | Age: 84
End: 2024-02-09
Payer: MEDICARE

## 2024-02-09 VITALS
WEIGHT: 180 LBS | HEART RATE: 92 BPM | OXYGEN SATURATION: 96 % | BODY MASS INDEX: 25.83 KG/M2 | DIASTOLIC BLOOD PRESSURE: 62 MMHG | SYSTOLIC BLOOD PRESSURE: 106 MMHG

## 2024-02-09 PROCEDURE — 93000 ELECTROCARDIOGRAM COMPLETE: CPT

## 2024-02-09 PROCEDURE — 99214 OFFICE O/P EST MOD 30 MIN: CPT

## 2024-02-09 RX ORDER — AMLODIPINE BESYLATE 10 MG/1
10 TABLET ORAL DAILY
Qty: 90 | Refills: 1 | Status: DISCONTINUED | COMMUNITY
Start: 2023-08-30 | End: 2024-02-09

## 2024-02-09 NOTE — REVIEW OF SYSTEMS
170.18 [Fever] : no fever [Headache] : no headache [Chills] : no chills [Feeling Fatigued] : not feeling fatigued [SOB] : shortness of breath [Dyspnea on exertion] : dyspnea during exertion [Chest Discomfort] : chest discomfort [Lower Ext Edema] : lower extremity edema [Leg Claudication] : no intermittent leg claudication [Palpitations] : no palpitations [Orthopnea] : no orthopnea [PND] : no PND [Syncope] : no syncope [Joint Pain] : joint pain [Negative] : Integumentary

## 2024-02-09 NOTE — HISTORY OF PRESENT ILLNESS
[FreeTextEntry1] : 84 yo M NSTEMI with MVCAD s/p PCI OM1, LAD, RCA 1/2023 Moderate mitral regurgitation Gastritis preDM2 He is here with c/o intermittent sternal chest pain. Which has been occurring for several weeks. c/o pedal edema and SOB. Pt has been eating salty foods more lately. He states the chest pain has not gotten worse and is fleeting, no relationship to exertion. He is accompanied by his daughter. He is taking all of his medications as ordered.  1/2024 VISIT: feels well. no exertional symptoms. cr 1.84. A1c 5.8.  Optimized CMP.   9/2023 VISIT: hypertension controlled and symptoms improved on utptitrated med therapy. saw renal, creatinine stable 1.82. feels well on exertion. could not do cardiac rehab.   8/30/23: went to [facility] in interim with chest pain/hypertension was treated with clonidine and labetalol. switched to ccb/bb instead of arb. no acs. cr improved actually to 1.82. echocardiogram today with no significant change. nuclear stress test had lateral wall ischemia; he had poor METS no ekg ischemia but he reports stopped because test was completed not because of symptom limitation.  he reports being diagnosed with a UTI and completed antibiotics. he said symptoms were more epigastric pain. has dyspnea unchanged.   8/2023: Has worsening creatinine unclear etiology.  Dental work in interim.  Came in in interim with high blood pressure. has orthostatic sx intermittently. no exertional symptoms. random atypical left chest pain.   5/2023: feels well overall without chest pain. no recurrence of that. intermittent dyspnea unfortunately but no alarm signs/symptoms. blood pressure elevated on home readings too after lowering pharmacologic therapy last visit. Echocardiogram stable overall. aorta 4.3 cm mod MR.    2/2023 VISIT: Underwent staged PCI of RCA and LAD.  Left CFA access site well-healing.  Dyspnea resolved.  On Plavix and aspirin.  Doing well.  Compliant.  No bleeding.  Pending cardiac rehab.  1/2023 INITIAL VISIT: here after nstemi from [facility]. rcfa and radial access sites well healing. does report atypical dyspnea consistent with brilinta side effect. no angina. ekg today without issue. does report fatigue and hypotension when on bb/arb. he was not on cv medications prior.  TESTING I REVIEWED TODAY: Excluding above

## 2024-02-09 NOTE — DISCUSSION/SUMMARY
[FreeTextEntry1] : D/W Dr Ward # NSTEMI with MVCAD s/p complete revascularization, moderate mitral regurgitation: EF preserve Will stop Amlodipine and start Imdur 30 mg Pt instructed not to take Viagra or Cialis. He understands. He will monitor his BP and call with BP>140 - Continue aspirin/Plavix. . Stop sodium intake increase exercise. Follow up in 2-3 weeks with echo to assess MR. Discussed red flag symptoms, which would warrant sooner or emergent medical evaluation. Any questions and concerns were addressed and resolved.   [EKG obtained to assist in diagnosis and management of assessed problem(s)] : EKG obtained to assist in diagnosis and management of assessed problem(s)

## 2024-02-12 NOTE — ASU PATIENT PROFILE, ADULT - FALL HARM RISK - HARM RISK INTERVENTIONS
Patient advised.  Verbalizes understanding.   Communicate Risk of Fall with Harm to all staff/Reinforce activity limits and safety measures with patient and family/Tailored Fall Risk Interventions/Visual Cue: Yellow wristband and red socks/Bed in lowest position, wheels locked, appropriate side rails in place/Call bell, personal items and telephone in reach/Instruct patient to call for assistance before getting out of bed or chair/Non-slip footwear when patient is out of bed/[place] to call system/Physically safe environment - no spills, clutter or unnecessary equipment/Purposeful Proactive Rounding/Room/bathroom lighting operational, light cord in reach

## 2024-02-22 LAB — HBA1C MFR BLD HPLC: 5.8

## 2024-02-28 ENCOUNTER — APPOINTMENT (OUTPATIENT)
Dept: CARDIOLOGY | Facility: CLINIC | Age: 84
End: 2024-02-28
Payer: MEDICARE

## 2024-02-28 VITALS
WEIGHT: 180 LBS | HEART RATE: 68 BPM | OXYGEN SATURATION: 97 % | HEIGHT: 70 IN | SYSTOLIC BLOOD PRESSURE: 124 MMHG | DIASTOLIC BLOOD PRESSURE: 62 MMHG | BODY MASS INDEX: 25.77 KG/M2

## 2024-02-28 PROCEDURE — 99214 OFFICE O/P EST MOD 30 MIN: CPT

## 2024-02-28 PROCEDURE — 93306 TTE W/DOPPLER COMPLETE: CPT

## 2024-02-28 PROCEDURE — 76376 3D RENDER W/INTRP POSTPROCES: CPT

## 2024-02-28 NOTE — HISTORY OF PRESENT ILLNESS
[FreeTextEntry1] : 84 yo M NSTEMI with MVCAD s/p PCI OM1, LAD, RCA 1/2023 Moderate mitral regurgitation Ascending aorta dilation  Gastritis preDM2  2/2024 VISIT: updated echo showed preserved EF, normal PA pressures. he has no exertional symptoms. few weeks ago he was an add on appointment due to ankle edema and chest pain; he was having salty foods. they switched ccb to nitrate. he was having intermittent sternal chest pain without any component of exertion.   1/2024 VISIT: feels well. no exertional symptoms. cr 1.84. A1c 5.8.  Optimized CMP.   9/2023 VISIT: hypertension controlled and symptoms improved on utptitrated med therapy. saw renal, creatinine stable 1.82. feels well on exertion. could not do cardiac rehab.   8/30/23: went to [facility] in interim with chest pain/hypertension was treated with clonidine and labetalol. switched to ccb/bb instead of arb. no acs. cr improved actually to 1.82. echocardiogram today with no significant change. nuclear stress test had lateral wall ischemia; he had poor METS no ekg ischemia but he reports stopped because test was completed not because of symptom limitation.  he reports being diagnosed with a UTI and completed antibiotics. he said symptoms were more epigastric pain. has dyspnea unchanged.   8/2023: Has worsening creatinine unclear etiology.  Dental work in interim.  Came in in interim with high blood pressure. has orthostatic sx intermittently. no exertional symptoms. random atypical left chest pain.   5/2023: feels well overall without chest pain. no recurrence of that. intermittent dyspnea unfortunately but no alarm signs/symptoms. blood pressure elevated on home readings too after lowering pharmacologic therapy last visit. Echocardiogram stable overall. aorta 4.3 cm mod MR.    2/2023 VISIT: Underwent staged PCI of RCA and LAD.  Left CFA access site well-healing.  Dyspnea resolved.  On Plavix and aspirin.  Doing well.  Compliant.  No bleeding.  Pending cardiac rehab.  1/2023 INITIAL VISIT: here after nstemi from [facility]. rcfa and radial access sites well healing. does report atypical dyspnea consistent with brilinta side effect. no angina. ekg today without issue. does report fatigue and hypotension when on bb/arb. he was not on cv medications prior.  TESTING I REVIEWED TODAY: Excluding above

## 2024-02-28 NOTE — CARDIOLOGY SUMMARY
[de-identified] : NSR  [de-identified] : 1/2023: EF 55%, moderate MR [de-identified] : 2/2023 cath: RCA and LAD WILLY. \par  1/2023: OM1 willy. prox RCA 70% and mid LAD 60-70%.

## 2024-02-28 NOTE — DISCUSSION/SUMMARY
[FreeTextEntry1] : 83 year old with NSTEMI 1/2023 with multivessel PCI for complete revascularization and moderate mitral regurgitation with CKD here for follow up.   He has no exertional symptoms. His EF remains preserved and valve stable to actually improved. He does have a moderate area of ischemia in lateral wall on nuclear perfusion imaging but is not having any EF change or symptoms from this. I offered coronary angiography for definitive diagnosis however we all agree to manage this pharmacologically.   Increase him imdur to 60. Off CCB due to LE edema. Off BB due to ED. In the futuer, for his diastolic dysfunction, I would consider spironolactone with close Creatinine check.   I prefer asprin/plavix long term. Maintain statin. cardiac rehab not approved   His ascending aorta is dilated on echo but stable for years. watch this on echo yearly. consider cta in future.   #CKD: unclear etiology - seeing nephrology in [facility]  - urology, hydrate. consider prostate issue   Follow in 3 months. ER precautions given to patient.

## 2024-04-08 RX ORDER — CLOPIDOGREL BISULFATE 75 MG/1
75 TABLET, FILM COATED ORAL
Qty: 90 | Refills: 3 | Status: ACTIVE | COMMUNITY
Start: 2023-01-23 | End: 1900-01-01

## 2024-04-08 RX ORDER — ATORVASTATIN CALCIUM 40 MG/1
40 TABLET, FILM COATED ORAL
Qty: 90 | Refills: 3 | Status: ACTIVE | COMMUNITY
Start: 1900-01-01 | End: 1900-01-01

## 2024-05-07 ENCOUNTER — INPATIENT (INPATIENT)
Facility: HOSPITAL | Age: 84
LOS: 0 days | Discharge: HOME | DRG: 313 | End: 2024-05-08
Attending: INTERNAL MEDICINE | Admitting: HOSPITALIST
Payer: MEDICARE

## 2024-05-07 VITALS
SYSTOLIC BLOOD PRESSURE: 201 MMHG | TEMPERATURE: 98 F | RESPIRATION RATE: 18 BRPM | OXYGEN SATURATION: 98 % | DIASTOLIC BLOOD PRESSURE: 106 MMHG | HEART RATE: 90 BPM

## 2024-05-07 DIAGNOSIS — Z96.652 PRESENCE OF LEFT ARTIFICIAL KNEE JOINT: Chronic | ICD-10-CM

## 2024-05-07 DIAGNOSIS — Z95.5 PRESENCE OF CORONARY ANGIOPLASTY IMPLANT AND GRAFT: Chronic | ICD-10-CM

## 2024-05-07 DIAGNOSIS — Z96.651 PRESENCE OF RIGHT ARTIFICIAL KNEE JOINT: Chronic | ICD-10-CM

## 2024-05-07 PROCEDURE — 99285 EMERGENCY DEPT VISIT HI MDM: CPT | Mod: GC

## 2024-05-07 NOTE — ED ADULT TRIAGE NOTE - CHIEF COMPLAINT QUOTE
Pt BIBEMS from home for having Chest Pain on the left side and pressure. Pt has a PMHx of NSTEMI and 3 CAD, on Plavix.

## 2024-05-08 ENCOUNTER — RESULT REVIEW (OUTPATIENT)
Age: 84
End: 2024-05-08

## 2024-05-08 ENCOUNTER — TRANSCRIPTION ENCOUNTER (OUTPATIENT)
Age: 84
End: 2024-05-08

## 2024-05-08 ENCOUNTER — APPOINTMENT (OUTPATIENT)
Dept: CARDIOLOGY | Facility: CLINIC | Age: 84
End: 2024-05-08

## 2024-05-08 VITALS
DIASTOLIC BLOOD PRESSURE: 88 MMHG | OXYGEN SATURATION: 96 % | RESPIRATION RATE: 20 BRPM | HEART RATE: 77 BPM | SYSTOLIC BLOOD PRESSURE: 142 MMHG

## 2024-05-08 DIAGNOSIS — I10 ESSENTIAL (PRIMARY) HYPERTENSION: ICD-10-CM

## 2024-05-08 DIAGNOSIS — I25.119 ATHEROSCLEROTIC HEART DISEASE OF NATIVE CORONARY ARTERY WITH UNSPECIFIED ANGINA PECTORIS: ICD-10-CM

## 2024-05-08 DIAGNOSIS — R07.9 CHEST PAIN, UNSPECIFIED: ICD-10-CM

## 2024-05-08 DIAGNOSIS — E78.5 HYPERLIPIDEMIA, UNSPECIFIED: ICD-10-CM

## 2024-05-08 LAB
ALBUMIN SERPL ELPH-MCNC: 3.8 G/DL — SIGNIFICANT CHANGE UP (ref 3.3–5.2)
ALP SERPL-CCNC: 108 U/L — SIGNIFICANT CHANGE UP (ref 40–120)
ALT FLD-CCNC: 14 U/L — SIGNIFICANT CHANGE UP
ANION GAP SERPL CALC-SCNC: 12 MMOL/L — SIGNIFICANT CHANGE UP (ref 5–17)
ANION GAP SERPL CALC-SCNC: 13 MMOL/L — SIGNIFICANT CHANGE UP (ref 5–17)
APTT BLD: 27.1 SEC — SIGNIFICANT CHANGE UP (ref 24.5–35.6)
AST SERPL-CCNC: 19 U/L — SIGNIFICANT CHANGE UP
BASOPHILS # BLD AUTO: 0.04 K/UL — SIGNIFICANT CHANGE UP (ref 0–0.2)
BASOPHILS # BLD AUTO: 0.04 K/UL — SIGNIFICANT CHANGE UP (ref 0–0.2)
BASOPHILS NFR BLD AUTO: 0.4 % — SIGNIFICANT CHANGE UP (ref 0–2)
BASOPHILS NFR BLD AUTO: 0.5 % — SIGNIFICANT CHANGE UP (ref 0–2)
BILIRUB SERPL-MCNC: 0.5 MG/DL — SIGNIFICANT CHANGE UP (ref 0.4–2)
BUN SERPL-MCNC: 21 MG/DL — HIGH (ref 8–20)
BUN SERPL-MCNC: 22.2 MG/DL — HIGH (ref 8–20)
CALCIUM SERPL-MCNC: 8.5 MG/DL — SIGNIFICANT CHANGE UP (ref 8.4–10.5)
CALCIUM SERPL-MCNC: 8.6 MG/DL — SIGNIFICANT CHANGE UP (ref 8.4–10.5)
CHLORIDE SERPL-SCNC: 100 MMOL/L — SIGNIFICANT CHANGE UP (ref 96–108)
CHLORIDE SERPL-SCNC: 103 MMOL/L — SIGNIFICANT CHANGE UP (ref 96–108)
CK SERPL-CCNC: 75 U/L — SIGNIFICANT CHANGE UP (ref 30–200)
CO2 SERPL-SCNC: 23 MMOL/L — SIGNIFICANT CHANGE UP (ref 22–29)
CO2 SERPL-SCNC: 24 MMOL/L — SIGNIFICANT CHANGE UP (ref 22–29)
CREAT SERPL-MCNC: 1.46 MG/DL — HIGH (ref 0.5–1.3)
CREAT SERPL-MCNC: 1.5 MG/DL — HIGH (ref 0.5–1.3)
CRP SERPL-MCNC: 6 MG/L — HIGH
EGFR: 46 ML/MIN/1.73M2 — LOW
EGFR: 48 ML/MIN/1.73M2 — LOW
EOSINOPHIL # BLD AUTO: 0.1 K/UL — SIGNIFICANT CHANGE UP (ref 0–0.5)
EOSINOPHIL # BLD AUTO: 0.18 K/UL — SIGNIFICANT CHANGE UP (ref 0–0.5)
EOSINOPHIL NFR BLD AUTO: 1.2 % — SIGNIFICANT CHANGE UP (ref 0–6)
EOSINOPHIL NFR BLD AUTO: 1.7 % — SIGNIFICANT CHANGE UP (ref 0–6)
ERYTHROCYTE [SEDIMENTATION RATE] IN BLOOD: 11 MM/HR — SIGNIFICANT CHANGE UP (ref 0–15)
GLUCOSE SERPL-MCNC: 103 MG/DL — HIGH (ref 70–99)
GLUCOSE SERPL-MCNC: 106 MG/DL — HIGH (ref 70–99)
HCT VFR BLD CALC: 39.1 % — SIGNIFICANT CHANGE UP (ref 39–50)
HCT VFR BLD CALC: 41.1 % — SIGNIFICANT CHANGE UP (ref 39–50)
HGB BLD-MCNC: 13.1 G/DL — SIGNIFICANT CHANGE UP (ref 13–17)
HGB BLD-MCNC: 13.4 G/DL — SIGNIFICANT CHANGE UP (ref 13–17)
IMM GRANULOCYTES NFR BLD AUTO: 0.3 % — SIGNIFICANT CHANGE UP (ref 0–0.9)
IMM GRANULOCYTES NFR BLD AUTO: 0.4 % — SIGNIFICANT CHANGE UP (ref 0–0.9)
INR BLD: 1 RATIO — SIGNIFICANT CHANGE UP (ref 0.85–1.18)
LYMPHOCYTES # BLD AUTO: 0.73 K/UL — LOW (ref 1–3.3)
LYMPHOCYTES # BLD AUTO: 1.15 K/UL — SIGNIFICANT CHANGE UP (ref 1–3.3)
LYMPHOCYTES # BLD AUTO: 10.9 % — LOW (ref 13–44)
LYMPHOCYTES # BLD AUTO: 8.5 % — LOW (ref 13–44)
MCHC RBC-ENTMCNC: 29.5 PG — SIGNIFICANT CHANGE UP (ref 27–34)
MCHC RBC-ENTMCNC: 29.7 PG — SIGNIFICANT CHANGE UP (ref 27–34)
MCHC RBC-ENTMCNC: 32.6 GM/DL — SIGNIFICANT CHANGE UP (ref 32–36)
MCHC RBC-ENTMCNC: 33.5 GM/DL — SIGNIFICANT CHANGE UP (ref 32–36)
MCV RBC AUTO: 88.7 FL — SIGNIFICANT CHANGE UP (ref 80–100)
MCV RBC AUTO: 90.3 FL — SIGNIFICANT CHANGE UP (ref 80–100)
MONOCYTES # BLD AUTO: 0.63 K/UL — SIGNIFICANT CHANGE UP (ref 0–0.9)
MONOCYTES # BLD AUTO: 1.11 K/UL — HIGH (ref 0–0.9)
MONOCYTES NFR BLD AUTO: 10.5 % — SIGNIFICANT CHANGE UP (ref 2–14)
MONOCYTES NFR BLD AUTO: 7.4 % — SIGNIFICANT CHANGE UP (ref 2–14)
NEUTROPHILS # BLD AUTO: 7.01 K/UL — SIGNIFICANT CHANGE UP (ref 1.8–7.4)
NEUTROPHILS # BLD AUTO: 8.07 K/UL — HIGH (ref 1.8–7.4)
NEUTROPHILS NFR BLD AUTO: 76.2 % — SIGNIFICANT CHANGE UP (ref 43–77)
NEUTROPHILS NFR BLD AUTO: 82 % — HIGH (ref 43–77)
PLATELET # BLD AUTO: 301 K/UL — SIGNIFICANT CHANGE UP (ref 150–400)
PLATELET # BLD AUTO: 301 K/UL — SIGNIFICANT CHANGE UP (ref 150–400)
POTASSIUM SERPL-MCNC: 3.6 MMOL/L — SIGNIFICANT CHANGE UP (ref 3.5–5.3)
POTASSIUM SERPL-MCNC: 3.8 MMOL/L — SIGNIFICANT CHANGE UP (ref 3.5–5.3)
POTASSIUM SERPL-SCNC: 3.6 MMOL/L — SIGNIFICANT CHANGE UP (ref 3.5–5.3)
POTASSIUM SERPL-SCNC: 3.8 MMOL/L — SIGNIFICANT CHANGE UP (ref 3.5–5.3)
PROT SERPL-MCNC: 6.4 G/DL — LOW (ref 6.6–8.7)
PROTHROM AB SERPL-ACNC: 11.1 SEC — SIGNIFICANT CHANGE UP (ref 9.5–13)
RBC # BLD: 4.41 M/UL — SIGNIFICANT CHANGE UP (ref 4.2–5.8)
RBC # BLD: 4.55 M/UL — SIGNIFICANT CHANGE UP (ref 4.2–5.8)
RBC # FLD: 13.3 % — SIGNIFICANT CHANGE UP (ref 10.3–14.5)
RBC # FLD: 13.4 % — SIGNIFICANT CHANGE UP (ref 10.3–14.5)
SODIUM SERPL-SCNC: 136 MMOL/L — SIGNIFICANT CHANGE UP (ref 135–145)
SODIUM SERPL-SCNC: 139 MMOL/L — SIGNIFICANT CHANGE UP (ref 135–145)
TROPONIN T, HIGH SENSITIVITY RESULT: 15 NG/L — SIGNIFICANT CHANGE UP (ref 0–51)
TROPONIN T, HIGH SENSITIVITY RESULT: 16 NG/L — SIGNIFICANT CHANGE UP (ref 0–51)
TROPONIN T, HIGH SENSITIVITY RESULT: 17 NG/L — SIGNIFICANT CHANGE UP (ref 0–51)
TROPONIN T, HIGH SENSITIVITY RESULT: 19 NG/L — SIGNIFICANT CHANGE UP (ref 0–51)
WBC # BLD: 10.58 K/UL — HIGH (ref 3.8–10.5)
WBC # BLD: 8.54 K/UL — SIGNIFICANT CHANGE UP (ref 3.8–10.5)
WBC # FLD AUTO: 10.58 K/UL — HIGH (ref 3.8–10.5)
WBC # FLD AUTO: 8.54 K/UL — SIGNIFICANT CHANGE UP (ref 3.8–10.5)

## 2024-05-08 PROCEDURE — 85610 PROTHROMBIN TIME: CPT

## 2024-05-08 PROCEDURE — 93010 ELECTROCARDIOGRAM REPORT: CPT

## 2024-05-08 PROCEDURE — 99222 1ST HOSP IP/OBS MODERATE 55: CPT

## 2024-05-08 PROCEDURE — 85652 RBC SED RATE AUTOMATED: CPT

## 2024-05-08 PROCEDURE — 99235 HOSP IP/OBS SAME DATE MOD 70: CPT

## 2024-05-08 PROCEDURE — 96374 THER/PROPH/DIAG INJ IV PUSH: CPT

## 2024-05-08 PROCEDURE — 99152 MOD SED SAME PHYS/QHP 5/>YRS: CPT

## 2024-05-08 PROCEDURE — 93458 L HRT ARTERY/VENTRICLE ANGIO: CPT

## 2024-05-08 PROCEDURE — 85025 COMPLETE CBC W/AUTO DIFF WBC: CPT

## 2024-05-08 PROCEDURE — C1769: CPT

## 2024-05-08 PROCEDURE — 71045 X-RAY EXAM CHEST 1 VIEW: CPT | Mod: 26

## 2024-05-08 PROCEDURE — C1894: CPT

## 2024-05-08 PROCEDURE — 93005 ELECTROCARDIOGRAM TRACING: CPT

## 2024-05-08 PROCEDURE — 84484 ASSAY OF TROPONIN QUANT: CPT

## 2024-05-08 PROCEDURE — 93454 CORONARY ARTERY ANGIO S&I: CPT | Mod: 26

## 2024-05-08 PROCEDURE — C1887: CPT

## 2024-05-08 PROCEDURE — 86140 C-REACTIVE PROTEIN: CPT

## 2024-05-08 PROCEDURE — 36415 COLL VENOUS BLD VENIPUNCTURE: CPT

## 2024-05-08 PROCEDURE — 99285 EMERGENCY DEPT VISIT HI MDM: CPT

## 2024-05-08 PROCEDURE — 93306 TTE W/DOPPLER COMPLETE: CPT

## 2024-05-08 PROCEDURE — 80048 BASIC METABOLIC PNL TOTAL CA: CPT

## 2024-05-08 PROCEDURE — 71045 X-RAY EXAM CHEST 1 VIEW: CPT

## 2024-05-08 PROCEDURE — 80053 COMPREHEN METABOLIC PANEL: CPT

## 2024-05-08 PROCEDURE — 93454 CORONARY ARTERY ANGIO S&I: CPT

## 2024-05-08 PROCEDURE — 93306 TTE W/DOPPLER COMPLETE: CPT | Mod: 26

## 2024-05-08 PROCEDURE — 85730 THROMBOPLASTIN TIME PARTIAL: CPT

## 2024-05-08 PROCEDURE — 82550 ASSAY OF CK (CPK): CPT

## 2024-05-08 RX ORDER — PANTOPRAZOLE SODIUM 20 MG/1
1 TABLET, DELAYED RELEASE ORAL
Qty: 0 | Refills: 0 | DISCHARGE

## 2024-05-08 RX ORDER — ZOLPIDEM TARTRATE 10 MG/1
1 TABLET ORAL
Qty: 0 | Refills: 0 | DISCHARGE

## 2024-05-08 RX ORDER — HYDRALAZINE HCL 50 MG
25 TABLET ORAL
Refills: 0 | Status: DISCONTINUED | OUTPATIENT
Start: 2024-05-08 | End: 2024-05-08

## 2024-05-08 RX ORDER — PANTOPRAZOLE SODIUM 20 MG/1
40 TABLET, DELAYED RELEASE ORAL
Refills: 0 | Status: DISCONTINUED | OUTPATIENT
Start: 2024-05-08 | End: 2024-05-08

## 2024-05-08 RX ORDER — ZOLPIDEM TARTRATE 10 MG/1
1 TABLET ORAL
Qty: 0 | Refills: 0 | DISCHARGE
Start: 2024-05-08

## 2024-05-08 RX ORDER — ISOSORBIDE MONONITRATE 60 MG/1
90 TABLET, EXTENDED RELEASE ORAL DAILY
Refills: 0 | Status: DISCONTINUED | OUTPATIENT
Start: 2024-05-08 | End: 2024-05-08

## 2024-05-08 RX ORDER — ISOSORBIDE MONONITRATE 60 MG/1
60 TABLET, EXTENDED RELEASE ORAL DAILY
Refills: 0 | Status: DISCONTINUED | OUTPATIENT
Start: 2024-05-08 | End: 2024-05-08

## 2024-05-08 RX ORDER — ALFUZOSIN HYDROCHLORIDE 10 MG/1
1 TABLET, EXTENDED RELEASE ORAL
Qty: 0 | Refills: 0 | DISCHARGE

## 2024-05-08 RX ORDER — ATORVASTATIN CALCIUM 80 MG/1
40 TABLET, FILM COATED ORAL AT BEDTIME
Refills: 0 | Status: DISCONTINUED | OUTPATIENT
Start: 2024-05-08 | End: 2024-05-08

## 2024-05-08 RX ORDER — FINASTERIDE 5 MG/1
1 TABLET, FILM COATED ORAL
Qty: 0 | Refills: 0 | DISCHARGE

## 2024-05-08 RX ORDER — ASPIRIN/CALCIUM CARB/MAGNESIUM 324 MG
81 TABLET ORAL DAILY
Refills: 0 | Status: DISCONTINUED | OUTPATIENT
Start: 2024-05-08 | End: 2024-05-08

## 2024-05-08 RX ORDER — ONDANSETRON 8 MG/1
4 TABLET, FILM COATED ORAL ONCE
Refills: 0 | Status: COMPLETED | OUTPATIENT
Start: 2024-05-08 | End: 2024-05-08

## 2024-05-08 RX ORDER — ISOSORBIDE MONONITRATE 60 MG/1
1 TABLET, EXTENDED RELEASE ORAL
Refills: 0 | DISCHARGE

## 2024-05-08 RX ORDER — SODIUM CHLORIDE 9 MG/ML
250 INJECTION INTRAMUSCULAR; INTRAVENOUS; SUBCUTANEOUS ONCE
Refills: 0 | Status: DISCONTINUED | OUTPATIENT
Start: 2024-05-08 | End: 2024-05-08

## 2024-05-08 RX ORDER — ISOSORBIDE MONONITRATE 60 MG/1
30 TABLET, EXTENDED RELEASE ORAL DAILY
Refills: 0 | Status: DISCONTINUED | OUTPATIENT
Start: 2024-05-08 | End: 2024-05-08

## 2024-05-08 RX ORDER — HYDRALAZINE HCL 50 MG
1 TABLET ORAL
Qty: 0 | Refills: 0 | DISCHARGE
Start: 2024-05-08

## 2024-05-08 RX ORDER — ISOSORBIDE MONONITRATE 60 MG/1
1 TABLET, EXTENDED RELEASE ORAL
Qty: 30 | Refills: 0
Start: 2024-05-08 | End: 2024-06-06

## 2024-05-08 RX ORDER — CLOPIDOGREL BISULFATE 75 MG/1
1 TABLET, FILM COATED ORAL
Qty: 0 | Refills: 0 | DISCHARGE
Start: 2024-05-08

## 2024-05-08 RX ORDER — ISOSORBIDE MONONITRATE 60 MG/1
30 TABLET, EXTENDED RELEASE ORAL ONCE
Refills: 0 | Status: COMPLETED | OUTPATIENT
Start: 2024-05-08 | End: 2024-05-08

## 2024-05-08 RX ORDER — ASPIRIN/CALCIUM CARB/MAGNESIUM 324 MG
1 TABLET ORAL
Qty: 0 | Refills: 0 | DISCHARGE
Start: 2024-05-08

## 2024-05-08 RX ORDER — CLOPIDOGREL BISULFATE 75 MG/1
75 TABLET, FILM COATED ORAL DAILY
Refills: 0 | Status: DISCONTINUED | OUTPATIENT
Start: 2024-05-08 | End: 2024-05-08

## 2024-05-08 RX ORDER — FINASTERIDE 5 MG/1
5 TABLET, FILM COATED ORAL DAILY
Refills: 0 | Status: DISCONTINUED | OUTPATIENT
Start: 2024-05-08 | End: 2024-05-08

## 2024-05-08 RX ORDER — ATORVASTATIN CALCIUM 80 MG/1
1 TABLET, FILM COATED ORAL
Qty: 0 | Refills: 0 | DISCHARGE
Start: 2024-05-08

## 2024-05-08 RX ORDER — HYDRALAZINE HCL 50 MG
1 TABLET ORAL
Refills: 0 | DISCHARGE

## 2024-05-08 RX ORDER — ZOLPIDEM TARTRATE 10 MG/1
5 TABLET ORAL AT BEDTIME
Refills: 0 | Status: DISCONTINUED | OUTPATIENT
Start: 2024-05-08 | End: 2024-05-08

## 2024-05-08 RX ORDER — PANTOPRAZOLE SODIUM 20 MG/1
1 TABLET, DELAYED RELEASE ORAL
Qty: 0 | Refills: 0 | DISCHARGE
Start: 2024-05-08

## 2024-05-08 RX ORDER — FINASTERIDE 5 MG/1
1 TABLET, FILM COATED ORAL
Qty: 0 | Refills: 0 | DISCHARGE
Start: 2024-05-08

## 2024-05-08 RX ORDER — NITROGLYCERIN 6.5 MG
0.4 CAPSULE, EXTENDED RELEASE ORAL
Refills: 0 | Status: DISCONTINUED | OUTPATIENT
Start: 2024-05-08 | End: 2024-05-08

## 2024-05-08 RX ORDER — HYDRALAZINE HCL 50 MG
5 TABLET ORAL ONCE
Refills: 0 | Status: COMPLETED | OUTPATIENT
Start: 2024-05-08 | End: 2024-05-08

## 2024-05-08 RX ORDER — TAMSULOSIN HYDROCHLORIDE 0.4 MG/1
0.8 CAPSULE ORAL AT BEDTIME
Refills: 0 | Status: DISCONTINUED | OUTPATIENT
Start: 2024-05-08 | End: 2024-05-08

## 2024-05-08 RX ADMIN — Medication 81 MILLIGRAM(S): at 10:27

## 2024-05-08 RX ADMIN — ISOSORBIDE MONONITRATE 30 MILLIGRAM(S): 60 TABLET, EXTENDED RELEASE ORAL at 14:04

## 2024-05-08 RX ADMIN — ISOSORBIDE MONONITRATE 30 MILLIGRAM(S): 60 TABLET, EXTENDED RELEASE ORAL at 10:28

## 2024-05-08 RX ADMIN — Medication 75 MILLIGRAM(S): at 06:12

## 2024-05-08 RX ADMIN — Medication 25 MILLIGRAM(S): at 06:12

## 2024-05-08 RX ADMIN — Medication 5 MILLIGRAM(S): at 10:58

## 2024-05-08 RX ADMIN — ONDANSETRON 4 MILLIGRAM(S): 8 TABLET, FILM COATED ORAL at 03:30

## 2024-05-08 RX ADMIN — PANTOPRAZOLE SODIUM 40 MILLIGRAM(S): 20 TABLET, DELAYED RELEASE ORAL at 08:19

## 2024-05-08 RX ADMIN — CLOPIDOGREL BISULFATE 75 MILLIGRAM(S): 75 TABLET, FILM COATED ORAL at 10:28

## 2024-05-08 NOTE — CONSULT NOTE ADULT - PROBLEM SELECTOR RECOMMENDATION 2
Poor controlled  Continue home meds with strict parameters and titrate as needed   Monitor BP  DASH diet

## 2024-05-08 NOTE — H&P ADULT - TIME BILLING
Labs/Imaging/Notes reviewed. Orders reviewed. Med rec confirmed. Orders placed. Cardio consulted appreciate reccs. Pt amenable to plan.

## 2024-05-08 NOTE — CONSULT NOTE ADULT - SUBJECTIVE AND OBJECTIVE BOX
HPI: This is a 84 year old male with a PMHx of NSTEMI, CAD (s/p STANLEY x3, pRCA, mLAD and OM1, last stents to pRCA and mLAD 02/2023), HTN, HLD, MVR who presented to the ED with complaints of chest pain that had been occurring the last three days. Patient states pain is intermittent, travels to his left shoulder and is associated with some SOB. In ED, Patient's troponin were negative (17 -> 16). Patient's last cardiac catherization in Feb 2024, where 2 STANLEY were placed to mLAD and pRCA. Patient states compliance with medications. Patient currently denies any palpitations, ALVES, syncope or LE swelling.     Symptoms:        Angina (Class): IV       Ischemic Symptoms: chest pain with associated SOB at rest      Heart Failure:        Systolic/Diastolic/Combined: diastolic        NYHA Class (within 2 weeks): II    Assessment of LVEF (Must be within 6 months):       EF: 55%       Assessed by:  ECHO       Date: 2/28/24    Prior Cardiac Interventions:       PCI's (Date, Stents, Vessels):   Cardiac Catheterization (02.09.23  Conclusions:   1. Successful staged IVUS-guided PCI of proximal RCA and mid LAD (iFR significant).  Prox RCA 3.5x38 post dil 4.5 mm   Mid LAD 2.75x33 post dil 4.0    2. Mildly elevated LVEDP.   Diagnostic Findings:   Coronary Angiography   The coronary circulation is right dominant.    LM   Left main artery: Angiography shows mild atherosclerosis.    LAD   Mid left anterior descending: There is a 70 % stenosis. Instant wave-free ratio was performed with  calculated value of 0.87.  Based on the results, the lesion was judged to be significant and an intervention was performed. Proximal left anterior descending: There is a 40 % stenosis.    CX   First obtuse marginal: There is a 0 % stenosis within the stented segment.  RCA   Proximal right coronary artery: There is a 75 % stenosis.    Left Heart Cath   Ejection fraction was calculated by echo with a value of 55%.    Interventional Findings:   Interventional Details   Proximal right coronary artery: This was a 75 % stenosis. The lesion length was 30 mm. This was an ACC/AHA High/C lesion for intervention. IVUS was performed. Guidewire crossing was successful.  < end of copied text >    CABG (Date, Grafts): n/a    Noninvasive Testing:   Stress Test: Date: 8/2023       Protocol: Omer       Duration of Exercise:        Symptoms:        EKG Changes: negative for ischemia       DTS: 3.0       Myocardial Imaging: Qualitative perfusion medium sizes, moderate defects in the lateral walls that is reversible suggestive of moderate ischemia. Hypokinesis of the lateral wall        Risk Assessment (Low, Medium, High):     Echo (Date, Findings): 2/28/24  Echo from 02/28/2024  CONCLUSIONS:  1. Left ventricular systolic function is normal with an ejection fraction of 55 %.  2. The left atrium is severely dilated.  3. Aortic root at the sinuses of Valsalva is dilated, measuring 4.50 cm (indexed 2.28 cm/m²). Ascending aorta diameter is dilated, measuring 3.90 cm (indexed 1.97 cm/m²).  4. Mild to moderate mitral regurgitation. RF 36%.  5. Mild tricuspid regurgitation.  6. Mild to moderate pulmonic regurgitation.  7. Estimated pulmonary artery systolic pressure is 21 mmHg.  8. Trace pericardial effusion.  9. Compared to the transthoracic echocardiogram performed on 8/30/2023, there have been no significant interval changes.      Antianginal Therapies:        Beta Blockers: n/a       Calcium Channel Blockers: n/a       Long Acting Nitrates: imdur       Ranexa: n/a    Associated Risk Factors:        Cerebrovascular Disease: N/A       Chronic Lung Disease: N/A       Peripheral Arterial Disease: N/A       Chronic Kidney Disease (if yes, what is GFR): Yes, GFR 48       Uncontrolled Diabetes (if yes, what is HgbA1C or FBS): N/A       Poorly Controlled Hypertension (if yes, what is SBP): N/A       Morbid Obesity (if yes, what is BMI): N/A       History of Recent Ventricular Arrhythmia: N/A       Inability to Ambulate Safely: N/A       Need for Therapeutic Anticoagulation: N/A       Antiplatelet or Contrast Allergy: N/A    REVIEW OF SYSTEMS:  CONSTITUTIONAL:  No weakness, fevers, or chills  EYES/ENT:  No visual changes, vertigo, or throat pain   NECK:  No pain or stiffness  RESPIRATORY:  patient endorses some SOB, cough, wheezing, hemoptysis  CARDIOVASCULAR:  Patient endorses intermittent chest pain, 4/10, that radiates to his left shoulder. Denies palpitations  GASTROINTESTINAL:  No abdominal pain, nausea, vomiting, or hematemesis; No diarrhea or constipation. No melena or hematochezia.  GENITOURINARY:  No dysuria, change in frequency, or hematuria  NEUROLOGICAL:  No numbness or weakness  SKIN:  No itching or rashes    ICU Vital Signs Last 24 Hrs  T(C): 36.5 (08 May 2024 10:29), Max: 36.7 (07 May 2024 22:21)  T(F): 97.7 (08 May 2024 10:29), Max: 98 (07 May 2024 22:21)  HR: 90 (08 May 2024 10:29) (83 - 95)  BP: 183/98 (08 May 2024 10:29) (157/95 - 201/106)  RR: 20 (08 May 2024 10:29) (16 - 22)  SpO2: 96% (08 May 2024 10:29) (94% - 98%)    O2 Parameters below as of 08 May 2024 10:29  Patient On (Oxygen Delivery Method): room air    PHYSICAL EXAM:  GENERAL: NAD, lying in bed comfortably  HEAD:  Atraumatic, normocephalic  EYES: EOMI, PERRLA, conjunctiva and sclera clear  NECK: Supple, trachea midline, no JVD  HEART: Regular rate and rhythm, no murmurs, rubs, or gallops  LUNGS: Unlabored respirations.  Clear to auscultation bilaterally, no crackles, wheezing, or rhonchi  ABDOMEN: Soft, nontender, nondistended, +BS  EXTREMITIES: 2+ peripheral pulses bilaterally. No clubbing, cyanosis, or edema  NERVOUS SYSTEM:  A&Ox3, moving all extremities, no focal deficits   SKIN: No rashes or lesions    LABS:                        13.4   8.54  )-----------( 301      ( 08 May 2024 08:47 )             41.1   05-08    136  |  100  |  21.0<H>  ----------------------------<  106<H>  3.8   |  23.0  |  1.46<H>    Ca    8.5      08 May 2024 08:47    TPro  6.4<L>  /  Alb  3.8  /  TBili  0.5  /  DBili  x   /  AST  19  /  ALT  14  /  AlkPhos  108  05-08  PT/INR - ( 08 May 2024 00:10 )   PT: 11.1 sec;   INR: 1.00 ratio    PTT - ( 08 May 2024 00:10 )  PTT:27.1 sec

## 2024-05-08 NOTE — H&P ADULT - HISTORY OF PRESENT ILLNESS
84 M PMHx NSTEMI, MVCAD s/p PCI OM1 1/2023 and s/p LHC/staged PCI via LFA with Dr. Ward: STANLEY x 1 to pRCA (XIENCE SKYPOINT 3.5x38mm) and STANLEY x 1 to mLAD on 2/2023, HTN, BPH, GERD, neuropathy BIBEMS from home for chest pain. Patient had chest pain, started 3 days ago, worse with laying flat/exertion, pain is dull/ache in left chest, radiates to right side and left shoulder/jaw, has experienced this pain before says it feels like his prior heart attacks, 6/10 at its worst, 4/10 on exam, took 325 mg chewable aspirin yesterday which helped the pain. Patient says he is compliant with meds, only reason why he came in was because his son begged him to come in, otherwise he felt fine. Endorses nausea, diaphoresis, dizziness, SOB. Denies fever, chills, N/V/D/C, cough, LE swelling. While in ED /106, trop x2 negative, EKG no ST changes, consulted cardiology. Planning cath in AM.  84 M PMHx NSTEMI, MVCAD s/p PCI OM1 1/2023 and s/p LHC/staged PCI via LFA with Dr. Ward: STANLEY x 1 to pRCA and STANLEY x 1 to mLAD on 2/2023, HTN, BPH, GERD, neuropathy BIBEMS from home for chest pain. Patient had chest pain, started 3 days ago, worse with laying flat/exertion, pain is dull/ache in left chest, radiates to right side and left shoulder/jaw, has experienced this pain before says it feels like his prior heart attacks, 6/10 at its worst, 4/10 on exam, took 325 mg chewable aspirin yesterday which helped the pain. Patient says he is compliant with meds, only reason why he came in was because his son begged him to come in, otherwise he felt fine. Endorses nausea, diaphoresis, dizziness, SOB. Denies fever, chills, N/V/D/C, cough, LE swelling. While in ED /106, trop x2 negative, EKG no ST changes, Cardiology Coinsulted.

## 2024-05-08 NOTE — ED ADULT NURSE REASSESSMENT NOTE - NS ED NURSE REASSESS COMMENT FT1
pt. resting in bed on cardiac monitor with even and unlabored respirations. pt. bladder scan showed 114 mL of urine. covering admitting MD notified.

## 2024-05-08 NOTE — H&P ADULT - ATTENDING COMMENTS
Changes made to history/exam/assessment/plan otherwise agree with documentation above, in brief:    ASSESSMENT:  84 M PMHx NSTEMI, 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 Patient admitted for ACS.     Plan:   Chest Pain 2/2 Unstable Angina r/o ACS  - hx of multiple stents, unintervened non-culprit lesions   - chest pressure w/ radiation to left shoulder/jaw, worse with exertion  - Pain relieved w/ chewable aspirin   - Hypertensive on admission   - Trop x3/CPK negative  - ESR WNL  - EKG, no ST changes  - Chest X-ray, no pulmonary infiltrates   - Continue ASA 81mg q24  - Plavix 75mg q24  - TSH/T4, HgbA1c, Lipid panel ordered for AM   - Cardiology consulted, recs appreciated   - Possible LHC in AM    HTN  - BP elevated on admission, since improved   - ordered home meds   - hydralazine 25 mg PO BID   - isosorbide mononitrate 30 mg PO qd   - Add Labetalol 10mg IV PRN    Prostatic hyperplasia   - Finasteride 5 mg PO qd   - Tamsulosin 0.8 mg PO qd

## 2024-05-08 NOTE — ED ADULT NURSE NOTE - NSFALLLASTSIX_ED_ALL_ED
Referred by: Laurel Conti MD; Medical Diagnosis (from order):    Diagnosis Information      Diagnosis    719.45 (ICD-9-CM) - M25.552 (ICD-10-CM) - Left hip pain                Physical Therapy -  Daily Treatment Note    Visit:  2     SUBJECTIVE                                                                                                             VISIT #2    Patient not seen in two weeks, patient cancelled her last appointment.    Patient reports noncompliant with exercises. Has not performed daily or even regularly.  Patient does not believe therapy will help her at all and does not know why she is here.  Patient initially asked to end visit early today, but then changed her mind.    Reports pain with initiation of ambulation at home.  Spends most of her day at home, watches over her grandson.        OBJECTIVE                                                                                                                     Range of Motion (ROM)   (degrees unless noted; active unless noted; norms in ( ); negative=lacking to 0, positive=beyond 0)   Hip:      - Flexion (100-120):        • Left: WNL        • Right: WNL    - Internal Rotation in supine (45-50):        • Left: WNL        • Right: WNL    - External Rotation in supine (45-50):        • Left: WNL pain        • Right: WNL     Palpation:   Comments / Details:   1/4 mild tenderness to left lateral hip    Wolffe Tenderness Scale (Number)  0: No tenderness to palpation           1: Mild tenderness to palpation   2: Mild/moderate tenderness to palpation with grimace and/or flinch    3: Severe tenderness with withdrawal to palpation   4: Withdrawal prior to palpation or to non-noxious stimuli        Special Tests:  ZAINAB Test: Left: positive  FADIR Test: Left: negative  Scour Test: Left: negative   TREATMENT                                                                                                                  Therapeutic Exercise:  Long  discussion fo patient compliance  Discussion of how exercise can help address hip bursitis short and long term    Reassessment supine hip Passive Range of Motion, hip palpation, hip special tests.    Hooklying adduction, soccer ball, 10 reps. 3 second hold each rep     Hooklying abduction, green band, 10 reps, 2 second hold each rep.    Hooklying partial bridges,  8x.     5xSTS,   No pain during or after.     Standing at countertop.    Hip extension, 10x each leg    Discussed plan of care going forward            Skilled input: verbal instruction/cues and tactile instruction/cues    Writer verbally educated and received verbal consent for hand placement, positioning of patient, and techniques to be performed today from patient for clothing adjustments for techniques, hand placement and palpation for techniques and therapist position for techniques as described above and how they are pertinent to the patient's plan of care.    Home Exercise Program: Access Code: [email or web address]              URL: [email or web address]       Date: 12/31/2020   Prepared by: Vikas Ridley      Exercises  Seated Hip Abduction with Resistance - 10 reps - 3x daily  Seated Hip Adduction Isometrics with Ball - 10 reps - 3x daily  Patient Education  FYWB: Heat/Cold Application [id number]  Sleep Positions  Trochanteric Bursitis     ASSESSMENT                                                                                                             Patient arrived today, 2 weeks after initial visit. Patient reports not following any of my instructions from initial visit including exercise performance. Patient with no change today in left lateral hip pain. Some tenderness to palpation and pain with stretch to hip internal rotators.  Normative hip Passive Range of Motion. Negative scour and faddir testing.       Spent time today discussing plan of care with patient, including potential discharge if she did not wish to continue with PT.  Discussed  with patient how I cannot help her if she doesn't follow exercise or activity instructions.    Patient with history of prior poor experience in PT. Discussed expectations with patient, including that it will take time to get stronger and being compliant with exercises will only help. Patient verbalizes understanding.       Patient wishes to be seen again next week.   Will assess for any evidence of learning.  Pain/symptoms after session: 3    Patient Education:   Results of above outlined education: No evidence of learning and Verbalizes understanding      PLAN                                                                                                                           Suggestions for next session as indicated: Progress per plan of care    Reassess for any compliance with education and exercise program.   Further glut strength and loading program, OKC and CKC            Procedures and total treatment time documented Time Entry flowsheet.   No.

## 2024-05-08 NOTE — DISCHARGE NOTE PROVIDER - NSDCFUSCHEDAPPT_GEN_ALL_CORE_FT
[facility]  CARDIOLOGY Luann1 Katelyn Flynn  Scheduled Appointment: 07/22/2024    Khanh Ward  [facility]  CARDIOLOGY Katerine Flynn  Scheduled Appointment: 08/05/2024

## 2024-05-08 NOTE — ED ADULT NURSE NOTE - NSFALLUNIVINTERV_ED_ALL_ED
Bed/Stretcher in lowest position, wheels locked, appropriate side rails in place/Call bell, personal items and telephone in reach/Instruct patient to call for assistance before getting out of bed/chair/stretcher/Non-slip footwear applied when patient is off stretcher/[place] to call system/Physically safe environment - no spills, clutter or unnecessary equipment/Purposeful proactive rounding/Room/bathroom lighting operational, light cord in reach

## 2024-05-08 NOTE — DISCHARGE NOTE PROVIDER - NSDCACTIVITY_GEN_ALL_CORE
No heavy lifting/straining Do not drive or operate machinery/Showering allowed/Walking - Indoors allowed/No heavy lifting/straining/Walking - Outdoors allowed

## 2024-05-08 NOTE — PROGRESS NOTE ADULT - SUBJECTIVE AND OBJECTIVE BOX
Now s/p LHC via RRA with radial band in place. Procedure performed by Dr. Shetty.  Pt arrived to recovery in NAD and HDS. RRA access site stable, no bleed/hematoma, distal pulse +2.    Procedure results:  normal coronaries     Medication received during procedure:  Versed:  Fentanyl:  Heparin:  Omnipaque:    Exam:   Neuro: A&O X4  CV: RRR  Lungs: CTA  Ext: + palp pulses.  Vascular access: RRA.  Site stable. No bleeding/hematoma/ecchymosis.  + cap refill     Plan:  -Formal cath report pending  -Post procedure management/monitoring per protocol  -Access site precautions  -Radial compression band removal at 14:00, patient may be oob at 15:00  -Labs and EKG in am  -Repeat ECG if any clinical indication or change on tele  -NS 250mL bolus post cath ordered   -Continue current medical therapy  -Dual anti platelet therapy with aspirin 81mg po daily/plavix 75mg po daily  -Cont statin therapy with Lipitor 40mg po qHS   -Educated regarding post procedure management and care  -Discussed the importance of RF modification  -F/U outpt in 2 weeks with Cardiologist Dr. Ward  -DISPO: Plan for D/C in am if remains HDS, ECG and labs in am stable and without complications

## 2024-05-08 NOTE — ED PROVIDER NOTE - OBJECTIVE STATEMENT
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  Patient endorses 3 weeks of worsening intermittent chest pressures elevated with nausea, diaphoresis, lightheadedness, shortness of breath.  Today chest pain became worse and had radiation down left arm.  He called EMS and he was instructed to  take 3 baby aspirin's.  Patient reports that he was last cath little more than a year ago and had 1 area of occlusion that was not stented and was left alone.  Patient follows with Dr. Ward in [place].

## 2024-05-08 NOTE — ED ADULT NURSE NOTE - OBJECTIVE STATEMENT
pt. states he has been having chest pain for the past few days that moves from the left side to right side of his chest, then down his left arm where he feels pins and needles. pt. axo3 with equal and unlabored respirations. Pt. states he took 4 chewable asprin before coming to ED. PMH of 3 stents placed last year with an NSTEMI.

## 2024-05-08 NOTE — H&P ADULT - NSHPPHYSICALEXAM_GEN_ALL_CORE
VITALS:   T(C): 36.3 (05-08-24 @ 04:38), Max: 36.7 (05-07-24 @ 22:21)  HR: 86 (05-08-24 @ 04:38) (83 - 90)  BP: 166/93 (05-08-24 @ 04:38) (157/95 - 201/106)  RR: 22 (05-08-24 @ 04:38) (16 - 22)  SpO2: 96% (05-08-24 @ 04:38) (94% - 98%)    GENERAL: NAD, lying in bed comfortably  HEAD:  Atraumatic, normocephalic  EYES: EOMI, PERRLA, conjunctiva and sclera clear  ENT: Moist mucous membranes  NECK: Supple, no JVD  HEART: Regular rate and rhythm, no murmurs, rubs, or gallops  LUNGS: Unlabored respirations.  Clear to auscultation bilaterally, no crackles, wheezing, or rhonchi  ABDOMEN: Soft, nontender, nondistended, +BS  EXTREMITIES: 2+ peripheral pulses bilaterally. No clubbing, cyanosis, or edema  NERVOUS SYSTEM:  A&Ox3, no focal deficits   SKIN: No rashes or lesions

## 2024-05-08 NOTE — CONSULT NOTE ADULT - PROBLEM SELECTOR RECOMMENDATION 3
Lipid panel fasting  Continue Statins. Monitor LFTs  DASH diet    Further recommendations base on above findings Lipid panel fasting  Continue Statins. Monitor LFTs  DASH diet    Further recommendations base on above findings  Case discussed with Dr Russ

## 2024-05-08 NOTE — ED ADULT NURSE REASSESSMENT NOTE - NS ED NURSE REASSESS COMMENT FT1
pt. resting in bed on cardiac monitor with equal and unlabored respirations. pt. showing no signs of distress at this time. pt. awaiting cardiology consult, vital signs stable.

## 2024-05-08 NOTE — CONSULT NOTE ADULT - SUBJECTIVE AND OBJECTIVE BOX
[facility]                                              CARDIOLOGY AT [address], [place] [address]                                             Telephone: [phone]. Fax:[phone]      CARDIOLOGY CONSULTATION NOTE                                                                                             History obtained by: Patient and medical record  Community Cardiologist: Dr Ward   obtained: No   Reason for Consultation: Chest pain    Chief complaint:   Patient is a 84y old  Male who presents with a chief complaint of chest pain.    HPI: 83 yo M with PMHx of NSTEMI, CAD (sp x 3  pRCA, mLAD and OM1, last stents 02/2023,) HTN, HLD, MVR presented to ED co  chest pain/pressure.  Pt having intermittent chest pain/pressure x 3 weeks associated with nauseas, dizziness, diaphoresis and SOB. Yesterday chest pain became worse and had radiation down left arm. Denies alleviating / aggravating factors.  He called EMS and he was instructed to  take 3 baby aspirin's.  Patient reported in the last cardiac cath he was told he  had 1 area of occlusion that was not stented and was left alone.      CARDIAC TESTING : All script reviewed.     Echo from 02/28/2024  CONCLUSIONS:  1. Left ventricular systolic function is normal with an ejection fraction of 55 %.  2. The left atrium is severely dilated.  3. Aortic root at the sinuses of Valsalva is dilated, measuring 4.50 cm (indexed 2.28 cm/m²). Ascending aorta diameter is dilated, measuring 3.90 cm (indexed 1.97 cm/m²).  4. Mild to moderate mitral regurgitation. RF 36%.  5. Mild tricuspid regurgitation.  6. Mild to moderate pulmonic regurgitation.  7. Estimated pulmonary artery systolic pressure is 21 mmHg.  8. Trace pericardial effusion.  9. Compared to the transthoracic echocardiogram performed on 8/30/2023, there have been no significant interval changes.    Cardiac Catheterization (02.09.23  Conclusions:   1. Successful staged IVUS-guided PCI of proximal RCA and mid LAD (iFR  significant).  Prox RCA 3.5x38 post dil 4.5 mm   Mid LAD 2.75x33 post dil 4.0    2. Mildly elevated LVEDP.     Diagnostic Findings:   Coronary Angiography   The coronary circulation is right dominant.    LM   Left main artery: Angiography shows mild atherosclerosis.    LAD   Mid left anterior descending: There is a 70 % stenosis. Instant  wave-free ratio was performed with a calculated value of 0.87.  Based on the results, the lesion was judged to be significant and an  intervention was performed. Proximal left anterior  descending: There is a 40 % stenosis.    CX   First obtuse marginal: There is a 0 % stenosis within the stented  segment.  RCA   Proximal right coronary artery: There is a 75 % stenosis.    Left Heart Cath   Ejection fraction was calculated by echo with a value of 55%.    Interventional Findings:   Interventional Details   Proximal right coronary artery: This was a 75 % stenosis. The lesion  length was 30 mm. This was an ACC/AHA High/C lesion  for intervention. IVUS was performed. Guidewire crossing was  successful.  < end of copied text >    Nuclear stress test 08/2023   Conclusion  Miocardial perfusion abnormal   EKG negative x ischemia  Qualitative perfusion medium sizes, moderate defects in the lateral walls that is reversible suggestive of moderate ischemia  Hypokinesis of the lateral wall  EF 59%  D[facility] treadmill score is 3.0    PAST MEDICAL HISTORY  Kidney stones  Gastritis  CAD (coronary artery disease)  NSTEMI (non-ST elevation myocardial infarction)  Hyperlipidemia  Mitral regurgitation    PAST SURGICAL HISTORY  Total knee replacement status, right  S/P total knee replacement, left  S/P coronary artery stent placement    SOCIAL HISTORY: Denies smoking/alcohol/drugs    FAMILY HISTORY:  No pertinent family history in first degree relatives    Family History of Cardiovascular Disease:  No   Coronary Artery Disease in first degree relative: No   Sudden Cardiac Death in First degree relative: No     HOME MEDICATIONS:   * Patient Currently Takes Medications as of 11-Aug-2023 12:34 documented in Structured Notes  · 	amLODIPine 10 mg oral tablet: 1 tab(s) orally once a day  · 	carvedilol 3.125 mg oral tablet: 1 tab(s) orally every 12 hours  · 	clopidogrel 75 mg oral tablet: 1 tab(s) orally once a day  · 	aspirin 81 mg oral tablet, chewable: 1 tab(s) orally once a day  · 	atorvastatin 40 mg oral tablet: 1 tab(s) orally once a day (at bedtime)  · 	finasteride 5 mg oral tablet: 1 tab(s) orally once a day  · 	zolpidem 10 mg oral tablet: 1 tab(s) orally once a day (at bedtime)  · 	pantoprazole 40 mg oral delayed release tablet: 1 tab(s) orally once a day  · 	alfuzosin 10 mg oral tablet, extended release: 1 tab(s) orally once a day  · 	pregabalin 75 mg oral capsule: 1 cap(s) orally 2 times a day      CURRENT OTHER MEDICATIONS:   ondansetron Injectable 4 milliGRAM(s) IV Push once, Stop order after: 1 Doses    ALLERGIES:   No Known Allergies    REVIEW OF SYMPTOMS:   CONSTITUTIONAL: No fever, no chills, no weight loss, no weight gain, no fatigue   ENMT:  No vertigo; No sinus or throat pain  NECK: No pain or stiffness  CARDIOVASCULAR: + chest pain/pressure, no dyspnea, no syncope/presyncope, no fatigue, no palpitations, no dizziness, no Orthopnea, no Paroxsymal nocturnal dyspnea  RESPIRATORY: No shortness of breath, no cough  : No dysuria, no hematuria   GI: + nausea, no diarrhea, no constipation, no abdominal pain  NEURO: No headache, no slurred speech   MUSCULOSKELETAL: No joint pain or swelling; No muscle, back, or extremity pain  PSYCH: No agitation, no anxiety.    ALL OTHER REVIEW OF SYSTEMS ARE NEGATIVE.    VITAL SIGNS:   T(C): 36.5 (05-08-24 @ 00:01), Max: 36.7 (05-07-24 @ 22:21)  T(F): 97.7 (05-08-24 @ 00:01), Max: 98 (05-07-24 @ 22:21)  HR: 88 (05-08-24 @ 00:01) (88 - 90)  BP: 168/97 (05-08-24 @ 00:01) (168/97 - 201/106)  RR: 22 (05-08-24 @ 00:01) (18 - 22)  SpO2: 95% (05-08-24 @ 00:01) (95% - 98%)      PHYSICAL EXAM:   Constitutional: Comfortable . No acute distress.   HEENT: Atraumatic and normocephalic , neck is supple . no JVD.   CNS: A&Ox3. No focal deficits.   Respiratory:  unlabored. No wheezing, No rhonchi. No crackles   Cardiovascular: RRR normal s1 s2. No murmur. No rubs or gallop.  Gastrointestinal: Soft, non-tender. +Bowel sounds.   Extremities: 2+ Peripheral Pulses, No edema  Psychiatric: Calm . no agitation.   Skin: Warm and dry    LABS:                         13.1   10.58 )-----------( 301      ( 08 May 2024 00:10 )             39.1     05-08    139  |  103  |  22.2<H>  ----------------------------<  103<H>  3.6   |  24.0  |  1.50<H>    Ca    8.6      08 May 2024 00:10    TPro  6.4<L>  /  Alb  3.8  /  TBili  0.5  /  DBili  x   /  AST  19  /  ALT  14  /  AlkPhos  108  05-08    PT/INR - ( 08 May 2024 00:10 )   PT: 11.1 sec;   INR: 1.00 ratio       PTT - ( 08 May 2024 00:10 )  PTT:27.1 sec    ECG:   Prior ECG: Yes     RADIOLOGY & ADDITIONAL STUDIES:       Preliminary evaluation, please await official recommendations     Assessment and recommendations are final when note is signed by the attending.                                      [facility]                                              CARDIOLOGY AT [address], [place] [address]                                             Telephone: [phone]. Fax:[phone]      CARDIOLOGY CONSULTATION NOTE                                                                                             History obtained by: Patient and medical record  Community Cardiologist: Dr Ward   obtained: No   Reason for Consultation: Chest pain    Chief complaint:   Patient is a 84y old  Male who presents with a chief complaint of chest pain.    HPI: 85 yo M with PMHx of NSTEMI, CAD (sp x 3  pRCA, mLAD and OM1, last stents 02/2023,) HTN, HLD, MVR presented to ED co  chest pain/pressure.  Pt having intermittent chest pain/pressure x 3 weeks associated with nauseas, dizziness, diaphoresis and SOB. Yesterday chest pain became worse and had radiation down left arm. Chest pain 4/10. Denies alleviating / aggravating factors.  He called EMS and he was instructed to  take 3 baby aspirin's.  Patient reported in the last cardiac cath he was told he  had 1 area of occlusion that was not stented and was left alone.      CARDIAC TESTING : All script reviewed.     Echo from 02/28/2024  CONCLUSIONS:  1. Left ventricular systolic function is normal with an ejection fraction of 55 %.  2. The left atrium is severely dilated.  3. Aortic root at the sinuses of Valsalva is dilated, measuring 4.50 cm (indexed 2.28 cm/m²). Ascending aorta diameter is dilated, measuring 3.90 cm (indexed 1.97 cm/m²).  4. Mild to moderate mitral regurgitation. RF 36%.  5. Mild tricuspid regurgitation.  6. Mild to moderate pulmonic regurgitation.  7. Estimated pulmonary artery systolic pressure is 21 mmHg.  8. Trace pericardial effusion.  9. Compared to the transthoracic echocardiogram performed on 8/30/2023, there have been no significant interval changes.    Cardiac Catheterization (02.09.23  Conclusions:   1. Successful staged IVUS-guided PCI of proximal RCA and mid LAD (iFR  significant).  Prox RCA 3.5x38 post dil 4.5 mm   Mid LAD 2.75x33 post dil 4.0    2. Mildly elevated LVEDP.     Diagnostic Findings:   Coronary Angiography   The coronary circulation is right dominant.    LM   Left main artery: Angiography shows mild atherosclerosis.    LAD   Mid left anterior descending: There is a 70 % stenosis. Instant  wave-free ratio was performed with a calculated value of 0.87.  Based on the results, the lesion was judged to be significant and an  intervention was performed. Proximal left anterior  descending: There is a 40 % stenosis.    CX   First obtuse marginal: There is a 0 % stenosis within the stented  segment.  RCA   Proximal right coronary artery: There is a 75 % stenosis.    Left Heart Cath   Ejection fraction was calculated by echo with a value of 55%.    Interventional Findings:   Interventional Details   Proximal right coronary artery: This was a 75 % stenosis. The lesion  length was 30 mm. This was an ACC/AHA High/C lesion  for intervention. IVUS was performed. Guidewire crossing was  successful.  < end of copied text >    Nuclear stress test 08/2023   Conclusion  Miocardial perfusion abnormal   EKG negative x ischemia  Qualitative perfusion medium sizes, moderate defects in the lateral walls that is reversible suggestive of moderate ischemia  Hypokinesis of the lateral wall  EF 59%  [facility] treadmill score is 3.0    PAST MEDICAL HISTORY  Kidney stones  Gastritis  CAD (coronary artery disease)  NSTEMI (non-ST elevation myocardial infarction)  Hyperlipidemia  Mitral regurgitation    PAST SURGICAL HISTORY  Total knee replacement status, right  S/P total knee replacement, left  S/P coronary artery stent placement    SOCIAL HISTORY: Denies smoking/alcohol/drugs    FAMILY HISTORY:  No pertinent family history in first degree relatives    Family History of Cardiovascular Disease:  No   Coronary Artery Disease in first degree relative: No   Sudden Cardiac Death in First degree relative: No     HOME MEDICATIONS:   * Patient Currently Takes Medications as of 11-Aug-2023 12:34 documented in Structured Notes  · 	amLODIPine 10 mg oral tablet: 1 tab(s) orally once a day  · 	carvedilol 3.125 mg oral tablet: 1 tab(s) orally every 12 hours  · 	clopidogrel 75 mg oral tablet: 1 tab(s) orally once a day  · 	aspirin 81 mg oral tablet, chewable: 1 tab(s) orally once a day  · 	atorvastatin 40 mg oral tablet: 1 tab(s) orally once a day (at bedtime)  · 	finasteride 5 mg oral tablet: 1 tab(s) orally once a day  · 	zolpidem 10 mg oral tablet: 1 tab(s) orally once a day (at bedtime)  · 	pantoprazole 40 mg oral delayed release tablet: 1 tab(s) orally once a day  · 	alfuzosin 10 mg oral tablet, extended release: 1 tab(s) orally once a day  · 	pregabalin 75 mg oral capsule: 1 cap(s) orally 2 times a day      CURRENT OTHER MEDICATIONS:   ondansetron Injectable 4 milliGRAM(s) IV Push once, Stop order after: 1 Doses    ALLERGIES:   No Known Allergies    REVIEW OF SYMPTOMS:   CONSTITUTIONAL: No fever, no chills, no weight loss, no weight gain, no fatigue   ENMT:  No vertigo; No sinus or throat pain  NECK: No pain or stiffness  CARDIOVASCULAR: + chest pain/pressure, no dyspnea, no syncope/presyncope, no fatigue, no palpitations, no dizziness, no Orthopnea, no Paroxsymal nocturnal dyspnea  RESPIRATORY: No shortness of breath, no cough  : No dysuria, no hematuria   GI: + nausea, no diarrhea, no constipation, no abdominal pain  NEURO: No headache, no slurred speech   MUSCULOSKELETAL: No joint pain or swelling; No muscle, back, or extremity pain  PSYCH: No agitation, no anxiety.    ALL OTHER REVIEW OF SYSTEMS ARE NEGATIVE.    VITAL SIGNS:   T(C): 36.5 (05-08-24 @ 00:01), Max: 36.7 (05-07-24 @ 22:21)  T(F): 97.7 (05-08-24 @ 00:01), Max: 98 (05-07-24 @ 22:21)  HR: 88 (05-08-24 @ 00:01) (88 - 90)  BP: 168/97 (05-08-24 @ 00:01) (168/97 - 201/106)  RR: 22 (05-08-24 @ 00:01) (18 - 22)  SpO2: 95% (05-08-24 @ 00:01) (95% - 98%)      PHYSICAL EXAM:   Constitutional: Comfortable . No acute distress.   HEENT: Atraumatic and normocephalic , neck is supple . no JVD.   CNS: A&Ox3. No focal deficits.   Respiratory:  unlabored. No wheezing, No rhonchi. No crackles   Cardiovascular: RRR normal s1 s2. No murmur. No rubs or gallop.  Gastrointestinal: Soft, non-tender. +Bowel sounds.   Extremities: 2+ Peripheral Pulses, No edema  Psychiatric: Calm . no agitation.   Skin: Warm and dry    LABS:                         13.1   10.58 )-----------( 301      ( 08 May 2024 00:10 )             39.1     05-08    139  |  103  |  22.2<H>  ----------------------------<  103<H>  3.6   |  24.0  |  1.50<H>    Ca    8.6      08 May 2024 00:10    TPro  6.4<L>  /  Alb  3.8  /  TBili  0.5  /  DBili  x   /  AST  19  /  ALT  14  /  AlkPhos  108  05-08    PT/INR - ( 08 May 2024 00:10 )   PT: 11.1 sec;   INR: 1.00 ratio       PTT - ( 08 May 2024 00:10 )  PTT:27.1 sec    ECG:   Prior ECG: Yes     RADIOLOGY & ADDITIONAL STUDIES:       Preliminary evaluation, please await official recommendations     Assessment and recommendations are final when note is signed by the attending.                                      [facility]                                              CARDIOLOGY AT [address], [place] [address]                                             Telephone: [phone]. Fax:[phone]      CARDIOLOGY CONSULTATION NOTE                                                                                             History obtained by: Patient and medical record  Community Cardiologist: Dr Ward   obtained: No   Reason for Consultation: Chest pain    Chief complaint:   Patient is a 84y old  Male who presents with a chief complaint of chest pain.    HPI: 83 yo M with PMHx of NSTEMI, CAD (sp x 3  pRCA, mLAD and OM1, last stents 02/2023,) HTN, HLD, MVR presented to ED co  chest pain/pressure.  Pt having intermittent chest pain/pressure x 3 weeks associated with nauseas, dizziness, diaphoresis and SOB. Yesterday chest pain became worse and had radiation down left arm. Chest pain 4/10. Denies alleviating / aggravating factors.  He called EMS and he was instructed to  take 3 baby aspirin's.  Patient reported in the last cardiac cath he was told he  had 1 area of occlusion that was not stented and was left alone.      CARDIAC TESTING : All script reviewed.     Echo from 02/28/2024  CONCLUSIONS:  1. Left ventricular systolic function is normal with an ejection fraction of 55 %.  2. The left atrium is severely dilated.  3. Aortic root at the sinuses of Valsalva is dilated, measuring 4.50 cm (indexed 2.28 cm/m²). Ascending aorta diameter is dilated, measuring 3.90 cm (indexed 1.97 cm/m²).  4. Mild to moderate mitral regurgitation. RF 36%.  5. Mild tricuspid regurgitation.  6. Mild to moderate pulmonic regurgitation.  7. Estimated pulmonary artery systolic pressure is 21 mmHg.  8. Trace pericardial effusion.  9. Compared to the transthoracic echocardiogram performed on 8/30/2023, there have been no significant interval changes.    Cardiac Catheterization (02.09.23  Conclusions:   1. Successful staged IVUS-guided PCI of proximal RCA and mid LAD (iFR  significant).  Prox RCA 3.5x38 post dil 4.5 mm   Mid LAD 2.75x33 post dil 4.0    2. Mildly elevated LVEDP.     Diagnostic Findings:   Coronary Angiography   The coronary circulation is right dominant.    LM   Left main artery: Angiography shows mild atherosclerosis.    LAD   Mid left anterior descending: There is a 70 % stenosis. Instant  wave-free ratio was performed with a calculated value of 0.87.  Based on the results, the lesion was judged to be significant and an  intervention was performed. Proximal left anterior  descending: There is a 40 % stenosis.    CX   First obtuse marginal: There is a 0 % stenosis within the stented  segment.  RCA   Proximal right coronary artery: There is a 75 % stenosis.    Left Heart Cath   Ejection fraction was calculated by echo with a value of 55%.    Interventional Findings:   Interventional Details   Proximal right coronary artery: This was a 75 % stenosis. The lesion  length was 30 mm. This was an ACC/AHA High/C lesion  for intervention. IVUS was performed. Guidewire crossing was  successful.  < end of copied text >    Nuclear stress test 08/2023   Conclusion  Miocardial perfusion abnormal   EKG negative x ischemia  Qualitative perfusion medium sizes, moderate defects in the lateral walls that is reversible suggestive of moderate ischemia  Hypokinesis of the lateral wall  EF 59%  [facility] treadmill score is 3.0    PAST MEDICAL HISTORY  Kidney stones  Gastritis  CAD (coronary artery disease)  NSTEMI (non-ST elevation myocardial infarction)  Hyperlipidemia  Mitral regurgitation    PAST SURGICAL HISTORY  Total knee replacement status, right  S/P total knee replacement, left  S/P coronary artery stent placement    SOCIAL HISTORY: Denies smoking/alcohol/drugs    FAMILY HISTORY:  No pertinent family history in first degree relatives    Family History of Cardiovascular Disease:  No   Coronary Artery Disease in first degree relative: No   Sudden Cardiac Death in First degree relative: No     HOME MEDICATIONS:   * Patient Currently Takes Medications as of 11-Aug-2023 12:34 documented in Structured Notes  · 	amLODIPine 10 mg oral tablet: 1 tab(s) orally once a day  · 	carvedilol 3.125 mg oral tablet: 1 tab(s) orally every 12 hours  · 	clopidogrel 75 mg oral tablet: 1 tab(s) orally once a day  · 	aspirin 81 mg oral tablet, chewable: 1 tab(s) orally once a day  · 	atorvastatin 40 mg oral tablet: 1 tab(s) orally once a day (at bedtime)  · 	finasteride 5 mg oral tablet: 1 tab(s) orally once a day  · 	zolpidem 10 mg oral tablet: 1 tab(s) orally once a day (at bedtime)  · 	pantoprazole 40 mg oral delayed release tablet: 1 tab(s) orally once a day  · 	alfuzosin 10 mg oral tablet, extended release: 1 tab(s) orally once a day  · 	pregabalin 75 mg oral capsule: 1 cap(s) orally 2 times a day      CURRENT OTHER MEDICATIONS:   ondansetron Injectable 4 milliGRAM(s) IV Push once, Stop order after: 1 Doses    ALLERGIES:   No Known Allergies    REVIEW OF SYMPTOMS:   CONSTITUTIONAL: No fever, no chills, no weight loss, no weight gain, no fatigue   ENMT:  No vertigo; No sinus or throat pain  NECK: No pain or stiffness  CARDIOVASCULAR: + chest pain/pressure, no dyspnea, no syncope/presyncope, no fatigue, no palpitations, no dizziness, no Orthopnea, no Paroxsymal nocturnal dyspnea  RESPIRATORY: No shortness of breath, no cough  : No dysuria, no hematuria   GI: + nausea, no diarrhea, no constipation, no abdominal pain  NEURO: No headache, no slurred speech   MUSCULOSKELETAL: No joint pain or swelling; No muscle, back, or extremity pain  PSYCH: No agitation, no anxiety.    ALL OTHER REVIEW OF SYSTEMS ARE NEGATIVE.    VITAL SIGNS:   T(C): 36.5 (05-08-24 @ 00:01), Max: 36.7 (05-07-24 @ 22:21)  T(F): 97.7 (05-08-24 @ 00:01), Max: 98 (05-07-24 @ 22:21)  HR: 88 (05-08-24 @ 00:01) (88 - 90)  BP: 168/97 (05-08-24 @ 00:01) (168/97 - 201/106)  RR: 22 (05-08-24 @ 00:01) (18 - 22)  SpO2: 95% (05-08-24 @ 00:01) (95% - 98%)      PHYSICAL EXAM:   Constitutional: Comfortable . No acute distress.   HEENT: Atraumatic and normocephalic , neck is supple . no JVD.   CNS: A&Ox3. No focal deficits.   Respiratory:  unlabored. No wheezing, No rhonchi. No crackles   Cardiovascular: RRR normal s1 s2. No murmur. No rubs or gallop.  Gastrointestinal: Soft, non-tender. +Bowel sounds.   Extremities: 2+ Peripheral Pulses, No edema  Psychiatric: Calm . no agitation.   Skin: Warm and dry    LABS:                         13.1   10.58 )-----------( 301      ( 08 May 2024 00:10 )             39.1     05-08    139  |  103  |  22.2<H>  ----------------------------<  103<H>  3.6   |  24.0  |  1.50<H>    Ca    8.6      08 May 2024 00:10    TPro  6.4<L>  /  Alb  3.8  /  TBili  0.5  /  DBili  x   /  AST  19  /  ALT  14  /  AlkPhos  108  05-08    PT/INR - ( 08 May 2024 00:10 )   PT: 11.1 sec;   INR: 1.00 ratio       PTT - ( 08 May 2024 00:10 )  PTT:27.1 sec    EKG NSR with some STW abnormalities in septal leads   Prior ECG: Yes     RADIOLOGY & ADDITIONAL STUDIES:       Preliminary evaluation, please await official recommendations     Assessment and recommendations are final when note is signed by the attending.

## 2024-05-08 NOTE — CONSULT NOTE ADULT - NS ATTEND AMEND GEN_ALL_CORE FT
seen with above,    84M history significant for HTN, HLD, CAD/MI 2023 with stents to pRCA, mLAD and OM, CKD 3, following with Dr. Ward, presents with acute onset chest pain with diaphoresis overnight, initial /100s, hs-Troponin and EKG normal, suspect unstable angina  -underwent LHC found with patent stents but jailed diagonal branch, continue medical therapy increase Imdur to 90mg (home dose 60mg) and has not been on CCB (due to edema) and BB (due to erectile dysfunction)  -office SBP 120s on hydralazine 25mg BID  -TTE done with preserved LV EF 60-65% and dilated ascending aorta 4.7 cm needs better BP control  -discharge planning tomorrow if no recurrent chest pain        Horacio Russ DO, [facility]  Faculty Non-Invasive Cardiologist  [phone] seen with above,    84M history significant for HTN, HLD, CAD/MI 2023 with stents to pRCA, mLAD and OM, CKD 3, following with Dr. Ward, presents with acute onset chest pain with diaphoresis overnight, initial /100s, hs-Troponin and EKG normal, suspect unstable angina  -underwent LHC found with patent stents but jailed diagonal branch, continue medical therapy increase Imdur to 90mg (home dose 60mg) and has not been on CCB (due to edema) and BB (due to erectile dysfunction)  -office SBP 120s on hydralazine 25mg BID  -TTE done with preserved LV EF 60-65% and dilated ascending aorta 4.7 cm needs better BP control  -discharge planning if no recurrent chest pain        Horacio Russ DO, [facility]  Faculty Non-Invasive Cardiologist  [phone]

## 2024-05-08 NOTE — H&P ADULT - ASSESSMENT
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 Patient admitted for ACS.     Plan:   Unstable angina   - hx of multiple stents, unintervened non-culprit lesions   - chest pressure w/ radiation to left shoulder/jaw, worse with exertion  - Pain relieved w/ chewable aspirin   - Hypertensive on admission   - Trop x2, non-elevated   - EKG, no ST changes  - Chest X-ray, no pulmonary infiltrates   - AUDREY score 5  - Continue 81 mg PO aspirin chewable   - Continue 75 mg PO qd clopidogrel, no loading dose, home med   - 3rd trop, ESR, TSH/T4, Lipid panel ordered for AM   - Cardiology consulted, recs appreciated   - Planning Cath in AM     HTN  - BP elevated on admission, since improved   - ordered home meds   - hydralazine 25 mg PO BID   - isosorbide mononitrate 30 mg PO qd     Prostatic hyperplasia   - Finasteride 5 mg PO qd   - Tamsulosin 0.8 mg PO qd     GERD   - Pantoprazole 40 mg PO qd    Neuropathy   - Pregabalin 75 mg PO qd     Diet: NPO for procedure  VTE: SCDs, aspirin/plavix   Dispo: Tele/EKG    84 M PMHx NSTEMI, 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 Patient admitted for ACS.     Plan:   Unstable angina   - hx of multiple stents, unintervened non-culprit lesions   - chest pressure w/ radiation to left shoulder/jaw, worse with exertion  - Pain relieved w/ chewable aspirin   - Hypertensive on admission   - Trop x3, non-elevated   - ESR WNL  - CK non-elevated   - EKG, no ST changes  - Chest X-ray, no pulmonary infiltrates   - AUDREY score 5  - Continue 81 mg PO aspirin chewable   - Continue 75 mg PO qd clopidogrel, no loading dose, home med   - TSH/T4, HgbA1c, Lipid panel ordered for AM   - Cardiology consulted, recs appreciated   - Planning Cath in AM     HTN  - BP elevated on admission, since improved   - ordered home meds   - hydralazine 25 mg PO BID   - isosorbide mononitrate 30 mg PO qd     Prostatic hyperplasia   - Finasteride 5 mg PO qd   - Tamsulosin 0.8 mg PO qd     GERD   - Pantoprazole 40 mg PO qd    Neuropathy   - Pregabalin 75 mg PO qd     Diet: NPO for procedure  VTE: SCDs, aspirin/plavix   Dispo: Tele/EKG    ASSESSMENT:  84 M PMHx NSTEMI, 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 Patient admitted for ACS.     Plan:   Chest Pain 2/2 Unstable Angina r/o ACS  - hx of multiple stents, unintervened non-culprit lesions   - chest pressure w/ radiation to left shoulder/jaw, worse with exertion  - Pain relieved w/ chewable aspirin   - Hypertensive on admission   - Trop x3/CPK negative  - ESR WNL  - EKG, no ST changes  - Chest X-ray, no pulmonary infiltrates   - Continue ASA 81mg q24  - Plavix 75mg q24  - TSH/T4, HgbA1c, Lipid panel ordered for AM   - Cardiology consulted, recs appreciated   - Possible LHC in AM    HTN  - BP elevated on admission, since improved   - ordered home meds   - hydralazine 25 mg PO BID   - isosorbide mononitrate 30 mg PO qd   - Add Labetalol 10mg IV PRN    Prostatic hyperplasia   - Finasteride 5 mg PO qd   - Tamsulosin 0.8 mg PO qd     GERD   - Pantoprazole 40 mg PO qd    Neuropathy   - Pregabalin 75 mg PO qd     Diet: NPO for procedure  VTE: SCDs, SQH 5000q8  Dispo: Tele/EKG

## 2024-05-08 NOTE — DISCHARGE NOTE PROVIDER - ATTENDING DISCHARGE PHYSICAL EXAMINATION:
CONSTITUTIONAL: Well appearing, awake, alert and in no apparent distress  CARDIAC: Normal rate, regular rhythm.  Heart sounds S1, S2.  No murmurs, rubs or gallops  reproducible chest pain across the chest- musculoskeletal like   RESPIRATORY: Breath sounds clear and equal bilaterally. No wheezes, rhales or rhonchi  EXTREMITIES: No edema, cyanosis or deformity   NEUROLOGICAL: Alert and oriented, no focal deficits, no motor or sensory deficits.  SKIN: No rash, skin turgor wnl

## 2024-05-08 NOTE — ED PROVIDER NOTE - ATTENDING CONTRIBUTION TO CARE
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  Patient endorses 3 weeks of worsening intermittent chest pressures elevated with nausea, diaphoresis, lightheadedness, shortness of breath.  Today chest pain became worse and had radiation down left arm.  He called EMS and he was instructed to  take 3 baby aspirin's.  Patient reports that he was last cath little more than a year ago and had 1 area of occlusion that was not stented and was left alone.    I, Alicja Kohli, personally saw the patient with the resident, and completed the key components of the history and physical exam. I then discussed the management plan with the resident.

## 2024-05-08 NOTE — DISCHARGE NOTE PROVIDER - HOSPITAL COURSE
83 yo M with Hx of NSTEMI, CAD (sp x 3 pRCA, mLAD and OM1, last stents 02/2023,) HTN, HLD, MVR presented to ED co  intermittent chest pain/pressure associated with nauseas, dizziness, diaphoresis and SOB, associated pain radiating down his left arm. Pt reported relief with asa at home. Pt admitted with chest pain with concern for ACS. Serial cardiac enzymes were negative, EKG showed NSR, with some septal lead st abnormalities. Pt was evaluated by cardiology, TTE performed and noted with preserved ef and dilated aorta. Pt s/p cardiac cath with noted patent vessels. Etiology of chest pain likely musculeketal vs spasm related. Imdur was increased from 30mg to 60mg po qd. Also pt with baseline ckd and recommended to monitor renal function with pcp post discharge, cr today was 1.4. Patient cleared by cardiology. Pt medically stable for discharge. Pt to f/u with pcp in 3-5 days. Pt to f/u outpt in 2 weeks with Cardiologist Dr. Ward. Son in [facility]- RN at [place] updated via in regard to the plan of care.       TtE  1. Left ventricular cavity is normal in size. Left ventricular systolic function is normal with an ejection   fraction visually estimated at 60 to 65 %.  2. Normal left ventricular diastolic function.  3. Mild left ventricular hypertrophy.  4. Normal right ventricular cavity size and normal systolic function.  5. No significant valvular disease.  6. Aortic root at the sinuses of Valsalva is dilated, measuring 4.20 cm (indexed 2.15 cm/m²). Ascending   aorta diameter is dilated, measuring 4.70 cm (indexed 2.41 cm/m²).  7. No prior echocardiogram is available for comparison.

## 2024-05-08 NOTE — DISCHARGE NOTE PROVIDER - CARE PROVIDER_API CALL
Khanh Ward)  Interventional Cardiology  [address] [place] [id number]  Phone: [phone]  Fax: [phone]  Established Patient  Follow Up Time: 2 weeks

## 2024-05-08 NOTE — DISCHARGE NOTE NURSING/CASE MANAGEMENT/SOCIAL WORK - NSDCPEFALRISK_GEN_ALL_CORE
For information on Fall & Injury Prevention, visit: [email or web address] OR  [email or web address] OR  https://www.cdc.gov/steadi/patient.html

## 2024-05-08 NOTE — DISCHARGE NOTE PROVIDER - NSDCMRMEDTOKEN_GEN_ALL_CORE_FT
alfuzosin 10 mg oral tablet, extended release: 1 tab(s) orally once a day  aspirin 81 mg oral tablet, chewable: 1 tab(s) orally once a day  atorvastatin 40 mg oral tablet: 1 tab(s) orally once a day (at bedtime)  clopidogrel 75 mg oral tablet: 1 tab(s) orally once a day  finasteride 5 mg oral tablet: 1 tab(s) orally once a day  hydrALAZINE 25 mg oral tablet: 1 tab(s) orally 2 times a day  isosorbide mononitrate 60 mg oral tablet, extended release: 1 tab(s) orally once a day  pantoprazole 40 mg oral delayed release tablet: 1 tab(s) orally once a day (before a meal)  pregabalin 75 mg oral capsule: 1 cap(s) orally 2 times a day  zolpidem 5 mg oral tablet: 1 tab(s) orally once a day (at bedtime) As needed Insomnia

## 2024-05-08 NOTE — CONSULT NOTE ADULT - PROBLEM SELECTOR RECOMMENDATION 9
85 yo M with PMHx of NSTEMI, CAD (sp x 3 pRCA, mLAD and OM1, last stents 02/2023,) HTN, HLD, MVR presented to ED co  intermittent chest pain/pressure associated with nauseas, dizziness, diaphoresis and SOB. Yesterday chest pain became worse and had radiation down left arm..  Patient reported in the last cardiac cath he was told he  had 1 area of occlusion that was not stented and was left alone.  Last cath 02/23 states pRCA 75% stenosis (high/C lesion for intervention (see full report above)  Pt took 3 ASA 81 mg at home. Please give 1 extra ASA 81 mg  Trop x 2 negative ( 17-16)   EKG   Telemonitor  Morphine PRN x chest pain   O2 NC PRN  Trend CE. Trend trops x 3. Serial EKGs  Obtain A1C, lipid panel fasting, TFTs to ro comorbidities   Obtain limit Echo to assess structural and functional status  Mantain K+~4 and mag~2  Keep NPO will discuss with attending in AM. Pt may need cardiac cath today 85 yo M with PMHx of NSTEMI, CAD (sp x 3 pRCA, mLAD and OM1, last stents 02/2023,) HTN, HLD, MVR presented to ED co  intermittent chest pain/pressure associated with nauseas, dizziness, diaphoresis and SOB. Yesterday chest pain became worse and had radiation down left arm..  Patient reported in the last cardiac cath he was told he  had 1 area of occlusion that was not stented and was left alone.  Last cath 02/23 states pRCA 75% stenosis (high/C lesion for intervention (see full report above)  Pt took 3 ASA 81 mg at home. Please give 1 extra ASA 81 mg  Trop x 2 negative ( 17-16)   EKG NSR with some STW abnormalities in septal leads   Telemonitor  Morphine PRN x chest pain   O2 NC PRN  Trend CE. Trend trops x 3. Serial EKGs  Obtain A1C, lipid panel fasting, TFTs to ro comorbidities   Obtain limit Echo to assess structural and functional status  Maintain K+~4 and mag~2

## 2024-05-08 NOTE — DISCHARGE NOTE PROVIDER - NSDCFUADDAPPT_GEN_ALL_CORE_FT
Please follow up with your primary care physician in 3-5 days. Please follow up with the cardiologist with scheduled appointment

## 2024-05-08 NOTE — CONSULT NOTE ADULT - ASSESSMENT
Impression: This is an 84 year old male with 3 previous STANLEY (pRCA, mLAD and OM1) presenting for a LHC in the setting of 3 days on intermittent chest pain that radiates to his left shoulder and is associated with SOB.     Risk Assessments:  ASA: 3  Mallampati: 2  GFR: 48  Cr: 1.46  BRA:     Plan:  -plan for LHC via RRA  -patient seen and examined  -confirmed appropriate NPO duration  -ECG and Labs reviewed  -Aspirin 81mg po pre-cath ordered  -NS 250cc bolus ordered   -procedure discussed with patient; risks and benefits explained, questions answered  -consent obtained by attending IC    
Patient is a 84y old  Male who presents with a chief complaint of chest pain.

## 2024-05-08 NOTE — DISCHARGE NOTE NURSING/CASE MANAGEMENT/SOCIAL WORK - PATIENT PORTAL LINK FT
You can access the FollowMyHealth Patient Portal offered by [facility] by registering at the following website: [email or web address]. By joining [employer]’s FollowMyHealth portal, you will also be able to view your health information using other applications (apps) compatible with our system.

## 2024-05-08 NOTE — ED PROVIDER NOTE - PROGRESS NOTE DETAILS
Troponin negative x 2.   Patient's significant cardiac history and recurrent chest pain will admit.  Case discussed with hospitalist Dr. Haley. Vicki Doe MD PGY-3

## 2024-05-08 NOTE — DISCHARGE NOTE PROVIDER - NSDCCPCAREPLAN_GEN_ALL_CORE_FT
PRINCIPAL DISCHARGE DIAGNOSIS  Diagnosis: Chest pain  Assessment and Plan of Treatment: Likely related to musculoskeletal versus spasm, continue with increased dose of imdur from 30mg to 60mg orally daily. Cardiac catheterization performed with patent vessels. Please follow up with the cardiologist with scheduled appointment. Please follow up with your primary care physician to monitor kidney function/ creatine after receiving contrast for the catheterization.      SECONDARY DISCHARGE DIAGNOSES  Diagnosis: HLD (hyperlipidemia)  Assessment and Plan of Treatment: Continue home medications.    Diagnosis: HTN (hypertension)  Assessment and Plan of Treatment: Continue to monitor your blood pressure. Your imdur was increased.    Diagnosis: Aortic aneurysm  Assessment and Plan of Treatment: Ascending aorta diameter is dilated, measuring 4.70 cm (indexed 2.41 cm/m²). Please follow this with your cardiologist.

## 2024-05-08 NOTE — ED PROVIDER NOTE - PHYSICAL EXAMINATION
Gen: no acute distress  Head: normocephalic, atraumatic  EENT: EOMI  Lung: no increased work of breathing, CTABL  CV: normal s1/s2  Abd: soft, non-tender, non-distended, no rebound tenderness or guarding  MSK: no visible deformities, full range of motion in all 4 extremities  Neuro: A&Ox4; No focal neurologic deficits

## 2024-05-08 NOTE — ED PROVIDER NOTE - CLINICAL SUMMARY MEDICAL DECISION MAKING FREE TEXT BOX
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   EKG normal sinus rhythm without acute STEMI.   Concerning given patient's history of multivessel CAD and now with recurrent chest pain over the last 3 weeks.  Will send for CBC, CMP, troponin, chest x-ray to evaluate for ACS, pneumonia, PTX.  Will place cardiology consult.

## 2024-05-11 RX ORDER — ONDANSETRON 8 MG/1
8 TABLET, ORALLY DISINTEGRATING ORAL EVERY 8 HOURS
Qty: 30 | Refills: 0 | Status: ACTIVE | COMMUNITY
Start: 2024-05-11 | End: 1900-01-01

## 2024-06-21 ENCOUNTER — APPOINTMENT (OUTPATIENT)
Dept: CARDIOLOGY | Facility: CLINIC | Age: 84
End: 2024-06-21
Payer: MEDICARE

## 2024-06-21 VITALS
HEIGHT: 70 IN | WEIGHT: 164 LBS | OXYGEN SATURATION: 96 % | BODY MASS INDEX: 23.48 KG/M2 | DIASTOLIC BLOOD PRESSURE: 72 MMHG | HEART RATE: 87 BPM | SYSTOLIC BLOOD PRESSURE: 130 MMHG

## 2024-06-21 DIAGNOSIS — E78.2 MIXED HYPERLIPIDEMIA: ICD-10-CM

## 2024-06-21 DIAGNOSIS — I25.2 OLD MYOCARDIAL INFARCTION: ICD-10-CM

## 2024-06-21 DIAGNOSIS — I34.0 NONRHEUMATIC MITRAL (VALVE) INSUFFICIENCY: ICD-10-CM

## 2024-06-21 DIAGNOSIS — N18.9 CHRONIC KIDNEY DISEASE, UNSPECIFIED: ICD-10-CM

## 2024-06-21 DIAGNOSIS — I25.10 ATHEROSCLEROTIC HEART DISEASE OF NATIVE CORONARY ARTERY W/OUT ANGINA PECTORIS: ICD-10-CM

## 2024-06-21 DIAGNOSIS — R06.02 SHORTNESS OF BREATH: ICD-10-CM

## 2024-06-21 DIAGNOSIS — I10 ESSENTIAL (PRIMARY) HYPERTENSION: ICD-10-CM

## 2024-06-21 PROCEDURE — 99214 OFFICE O/P EST MOD 30 MIN: CPT

## 2024-06-21 PROCEDURE — 93000 ELECTROCARDIOGRAM COMPLETE: CPT

## 2024-06-21 RX ORDER — ISOSORBIDE MONONITRATE 30 MG/1
30 TABLET, EXTENDED RELEASE ORAL
Refills: 0 | Status: ACTIVE | COMMUNITY

## 2024-06-21 NOTE — HISTORY OF PRESENT ILLNESS
[FreeTextEntry1] : 84 year old male here for cv care for:  NSTEMI with MVCAD s/p PCI OM1, LAD, RCA 1/2023 Ascending aorta dilation  Gastritis  6/2024 VISIT: interim [facility] presentation chest pain and HTN found to have patent stents and branch disease, optimizing meds. he reported nausea thereafter but no repeat labs done. pending colon/endoscopies.  2/2024 VISIT: updated echo showed preserved EF, normal PA pressures. he has no exertional symptoms. few weeks ago he was an add on appointment due to ankle edema and chest pain; he was having salty foods. they switched ccb to nitrate. he was having intermittent sternal chest pain without any component of exertion.   1/2024 VISIT: feels well. no exertional symptoms. cr 1.84. A1c 5.8.  Optimized CMP.   9/2023 VISIT: hypertension controlled and symptoms improved on utptitrated med therapy. saw renal, creatinine stable 1.82. feels well on exertion. could not do cardiac rehab.   8/30/23: went to [facility] in interim with chest pain/hypertension was treated with clonidine and labetalol. switched to ccb/bb instead of arb. no acs. cr improved actually to 1.82. echocardiogram today with no significant change. nuclear stress test had lateral wall ischemia; he had poor METS no ekg ischemia but he reports stopped because test was completed not because of symptom limitation.  he reports being diagnosed with a UTI and completed antibiotics. he said symptoms were more epigastric pain. has dyspnea unchanged.   8/2023: Has worsening creatinine unclear etiology.  Dental work in interim.  Came in in interim with high blood pressure. has orthostatic sx intermittently. no exertional symptoms. random atypical left chest pain.   5/2023: feels well overall without chest pain. no recurrence of that. intermittent dyspnea unfortunately but no alarm signs/symptoms. blood pressure elevated on home readings too after lowering pharmacologic therapy last visit. Echocardiogram stable overall. aorta 4.3 cm mod MR.    2/2023 VISIT: Underwent staged PCI of RCA and LAD.  Left CFA access site well-healing.  Dyspnea resolved.  On Plavix and aspirin.  Doing well.  Compliant.  No bleeding.  Pending cardiac rehab.  1/2023 INITIAL VISIT: here after nstemi from [facility]. rcfa and radial access sites well healing. does report atypical dyspnea consistent with brilinta side effect. no angina. ekg today without issue. does report fatigue and hypotension when on bb/arb. he was not on cv medications prior.  **TESTING I REVIEWED TODAY: Excluding above

## 2024-06-21 NOTE — CARDIOLOGY SUMMARY
[de-identified] : NSR  [de-identified] : 2/2023 cath: RCA and LAD WILLY. \par  1/2023: OM1 willy. prox RCA 70% and mid LAD 60-70%.  [de-identified] : 1/2023: EF 55%, moderate MR

## 2024-06-21 NOTE — DISCUSSION/SUMMARY
[FreeTextEntry1] : 84 year old with NSTEMI 1/2023 with multivessel PCI for complete revascularization and mitral regurgitation with CKD here for follow up.   He has patent coronary stents 5/2024. His EF remains preserved and mitral valve stable to actually improved. He does have a moderate area of ischemia in lateral wall on nuclear perfusion imaging but is not having any EF change and it is known to be from diagonal branch disease.   We are optimizing med therapy. On nitrate. Off CCB (edema) and BB (ED). In the future, for his diastolic dysfunction, I would consider spironolactone or ARNI with close Creatinine check.   I prefer asprin/plavix long term. Maintain statin. cardiac rehab not approved   His ascending aorta is dilated on echo but stable for years. watch this on echo yearly. consider cta in future.   #CKD:  - seeing nephrology in [facility]  - urology, hydrate. consider prostate issue   Follow in 6 weeks. ER precautions given to patient.

## 2024-06-21 NOTE — ADDENDUM
[FreeTextEntry1] : # Pre operative cv evaluation for endoscopy/colonoscopy: patent coronary stents 5/2024. METS>4. No recent ACS, decompensated heart failure, preclusive arrhythmias. - No further cardiac testing required prior to procedure. Continue cardiovascular medications as tolerated georges-procedurally EXCEPT can hold clopidogrel up to 7 days prior and restart 24 hours after hemostasis at discretion of proceduralist. Minimize duration off clopidogrel. Patient is not at a prohibitive risk for AMI or CHF georges-procedurally.

## 2024-07-22 ENCOUNTER — APPOINTMENT (OUTPATIENT)
Dept: CARDIOLOGY | Facility: CLINIC | Age: 84
End: 2024-07-22

## 2024-07-24 ENCOUNTER — APPOINTMENT (OUTPATIENT)
Dept: CARDIOLOGY | Facility: CLINIC | Age: 84
End: 2024-07-24

## 2024-08-05 ENCOUNTER — APPOINTMENT (OUTPATIENT)
Dept: CARDIOLOGY | Facility: CLINIC | Age: 84
End: 2024-08-05

## 2024-09-20 ENCOUNTER — APPOINTMENT (OUTPATIENT)
Dept: DERMATOLOGY | Facility: CLINIC | Age: 84
End: 2024-09-20
Payer: MEDICARE

## 2024-09-20 ENCOUNTER — APPOINTMENT (OUTPATIENT)
Dept: DERMATOLOGY | Facility: CLINIC | Age: 84
End: 2024-09-20

## 2024-09-20 PROCEDURE — 17003 DESTRUCT PREMALG LES 2-14: CPT

## 2024-09-20 PROCEDURE — 99204 OFFICE O/P NEW MOD 45 MIN: CPT | Mod: 25

## 2024-09-20 PROCEDURE — 17000 DESTRUCT PREMALG LESION: CPT

## 2024-10-11 ENCOUNTER — APPOINTMENT (OUTPATIENT)
Dept: DERMATOLOGY | Facility: CLINIC | Age: 84
End: 2024-10-11
Payer: MEDICARE

## 2024-10-11 PROCEDURE — 99213 OFFICE O/P EST LOW 20 MIN: CPT

## 2024-12-02 NOTE — ED ADULT TRIAGE NOTE - BSA (M2)

## 2025-02-06 ENCOUNTER — NON-APPOINTMENT (OUTPATIENT)
Age: 85
End: 2025-02-06

## 2025-03-31 ENCOUNTER — APPOINTMENT (OUTPATIENT)
Dept: CARDIOLOGY | Facility: CLINIC | Age: 85
End: 2025-03-31
Payer: COMMERCIAL

## 2025-03-31 ENCOUNTER — NON-APPOINTMENT (OUTPATIENT)
Age: 85
End: 2025-03-31

## 2025-03-31 VITALS
WEIGHT: 164 LBS | HEART RATE: 87 BPM | OXYGEN SATURATION: 96 % | SYSTOLIC BLOOD PRESSURE: 120 MMHG | DIASTOLIC BLOOD PRESSURE: 70 MMHG | BODY MASS INDEX: 23.48 KG/M2 | HEIGHT: 70 IN

## 2025-03-31 DIAGNOSIS — I34.0 NONRHEUMATIC MITRAL (VALVE) INSUFFICIENCY: ICD-10-CM

## 2025-03-31 DIAGNOSIS — Z01.810 ENCOUNTER FOR PREPROCEDURAL CARDIOVASCULAR EXAMINATION: ICD-10-CM

## 2025-03-31 DIAGNOSIS — I25.10 ATHEROSCLEROTIC HEART DISEASE OF NATIVE CORONARY ARTERY W/OUT ANGINA PECTORIS: ICD-10-CM

## 2025-03-31 DIAGNOSIS — E78.2 MIXED HYPERLIPIDEMIA: ICD-10-CM

## 2025-03-31 PROCEDURE — 99215 OFFICE O/P EST HI 40 MIN: CPT

## 2025-03-31 PROCEDURE — 99205 OFFICE O/P NEW HI 60 MIN: CPT

## 2025-03-31 PROCEDURE — 93000 ELECTROCARDIOGRAM COMPLETE: CPT | Mod: NC

## 2025-03-31 RX ORDER — DAPAGLIFLOZIN 5 MG/1
5 TABLET, FILM COATED ORAL DAILY
Refills: 0 | Status: ACTIVE | COMMUNITY

## 2025-03-31 RX ORDER — SERTRALINE 25 MG/1
25 TABLET, FILM COATED ORAL DAILY
Refills: 0 | Status: ACTIVE | COMMUNITY

## 2025-04-15 ENCOUNTER — APPOINTMENT (OUTPATIENT)
Dept: DERMATOLOGY | Facility: CLINIC | Age: 85
End: 2025-04-15
Payer: MEDICARE

## 2025-04-15 PROCEDURE — 99213 OFFICE O/P EST LOW 20 MIN: CPT | Mod: 25

## 2025-04-15 PROCEDURE — 17000 DESTRUCT PREMALG LESION: CPT

## 2025-04-15 PROCEDURE — 17003 DESTRUCT PREMALG LES 2-14: CPT

## 2025-06-24 NOTE — CONSULT NOTE ADULT - PROBLEM/RECOMMENDATION-1
DISPLAY PLAN FREE TEXT Bill For Surgical Tray: no Billing Type: Third-Party Bill Expected Date Of Service: 06/24/2025

## 2025-07-02 ENCOUNTER — APPOINTMENT (OUTPATIENT)
Dept: CARDIOLOGY | Facility: CLINIC | Age: 85
End: 2025-07-02

## 2025-07-02 ENCOUNTER — APPOINTMENT (OUTPATIENT)
Dept: DERMATOLOGY | Facility: CLINIC | Age: 85
End: 2025-07-02

## 2025-08-04 ENCOUNTER — APPOINTMENT (OUTPATIENT)
Dept: DERMATOLOGY | Facility: CLINIC | Age: 85
End: 2025-08-04
Payer: MEDICARE

## 2025-08-04 PROCEDURE — 99213 OFFICE O/P EST LOW 20 MIN: CPT | Mod: 25

## 2025-08-04 PROCEDURE — 17000 DESTRUCT PREMALG LESION: CPT
